# Patient Record
Sex: FEMALE | Race: BLACK OR AFRICAN AMERICAN | NOT HISPANIC OR LATINO | Employment: FULL TIME | URBAN - METROPOLITAN AREA
[De-identification: names, ages, dates, MRNs, and addresses within clinical notes are randomized per-mention and may not be internally consistent; named-entity substitution may affect disease eponyms.]

---

## 2016-06-27 LAB
CHOLEST SERPL-MCNC: 148 MG/DL
GLUCOSE SERPL-MCNC: 75 MG/DL (ref 70–120)
HDLC SERPL-MCNC: 55 MG/DL
LDLC SERPL CALC-MCNC: 80 MG/DL
TRIGL SERPL-MCNC: 64 MG/DL

## 2017-03-02 ENCOUNTER — TRANSFERRED RECORDS (OUTPATIENT)
Dept: HEALTH INFORMATION MANAGEMENT | Facility: CLINIC | Age: 52
End: 2017-03-02

## 2017-03-02 ENCOUNTER — HOSPITAL ENCOUNTER (OUTPATIENT)
Dept: MAMMOGRAPHY | Facility: HOSPITAL | Age: 52
Discharge: HOME | End: 2017-03-02
Attending: FAMILY MEDICINE

## 2017-03-02 DIAGNOSIS — Z12.31 VISIT FOR SCREENING MAMMOGRAM: ICD-10-CM

## 2017-03-03 ENCOUNTER — TRANSFERRED RECORDS (OUTPATIENT)
Dept: HEALTH INFORMATION MANAGEMENT | Facility: CLINIC | Age: 52
End: 2017-03-03

## 2017-03-06 ENCOUNTER — RECORDS - HEALTHEAST (OUTPATIENT)
Dept: ADMINISTRATIVE | Facility: OTHER | Age: 52
End: 2017-03-06

## 2017-03-07 ENCOUNTER — HOSPITAL ENCOUNTER (OUTPATIENT)
Dept: MAMMOGRAPHY | Facility: HOSPITAL | Age: 52
Discharge: HOME | End: 2017-03-07
Attending: FAMILY MEDICINE

## 2017-03-07 ENCOUNTER — TRANSFERRED RECORDS (OUTPATIENT)
Dept: HEALTH INFORMATION MANAGEMENT | Facility: CLINIC | Age: 52
End: 2017-03-07

## 2017-03-07 DIAGNOSIS — N64.89 BREAST ASYMMETRY: ICD-10-CM

## 2017-05-30 ENCOUNTER — TRANSFERRED RECORDS (OUTPATIENT)
Dept: HEALTH INFORMATION MANAGEMENT | Facility: CLINIC | Age: 52
End: 2017-05-30

## 2017-12-06 ENCOUNTER — TRANSFERRED RECORDS (OUTPATIENT)
Dept: HEALTH INFORMATION MANAGEMENT | Facility: CLINIC | Age: 52
End: 2017-12-06

## 2017-12-29 ENCOUNTER — TRANSFERRED RECORDS (OUTPATIENT)
Dept: HEALTH INFORMATION MANAGEMENT | Facility: CLINIC | Age: 52
End: 2017-12-29

## 2018-03-20 ENCOUNTER — HOSPITAL ENCOUNTER (OUTPATIENT)
Dept: MAMMOGRAPHY | Facility: CLINIC | Age: 53
Discharge: HOME | End: 2018-03-20

## 2018-03-20 ENCOUNTER — TRANSFERRED RECORDS (OUTPATIENT)
Dept: HEALTH INFORMATION MANAGEMENT | Facility: CLINIC | Age: 53
End: 2018-03-20

## 2018-03-20 DIAGNOSIS — Z12.31 VISIT FOR SCREENING MAMMOGRAM: ICD-10-CM

## 2019-03-19 ENCOUNTER — OFFICE VISIT (OUTPATIENT)
Dept: FAMILY MEDICINE | Facility: CLINIC | Age: 54
End: 2019-03-19
Payer: COMMERCIAL

## 2019-03-19 VITALS
RESPIRATION RATE: 16 BRPM | HEART RATE: 58 BPM | TEMPERATURE: 97.1 F | WEIGHT: 228.7 LBS | HEIGHT: 63 IN | DIASTOLIC BLOOD PRESSURE: 95 MMHG | SYSTOLIC BLOOD PRESSURE: 139 MMHG | BODY MASS INDEX: 40.52 KG/M2

## 2019-03-19 DIAGNOSIS — R42 DIZZINESS: ICD-10-CM

## 2019-03-19 DIAGNOSIS — E86.0 DEHYDRATION: ICD-10-CM

## 2019-03-19 DIAGNOSIS — Z00.01 ENCOUNTER FOR ROUTINE ADULT MEDICAL EXAM WITH ABNORMAL FINDINGS: Primary | ICD-10-CM

## 2019-03-19 DIAGNOSIS — Z13.220 SCREENING FOR LIPID DISORDERS: ICD-10-CM

## 2019-03-19 DIAGNOSIS — E66.01 MORBID OBESITY (H): ICD-10-CM

## 2019-03-19 LAB
ANION GAP SERPL CALCULATED.3IONS-SCNC: 4 MMOL/L (ref 3–14)
BUN SERPL-MCNC: 11 MG/DL (ref 7–30)
CALCIUM SERPL-MCNC: 8.5 MG/DL (ref 8.5–10.1)
CHLORIDE SERPL-SCNC: 109 MMOL/L (ref 94–109)
CHOLEST SERPL-MCNC: 170 MG/DL
CO2 SERPL-SCNC: 27 MMOL/L (ref 20–32)
CREAT SERPL-MCNC: 0.92 MG/DL (ref 0.52–1.04)
ERYTHROCYTE [DISTWIDTH] IN BLOOD BY AUTOMATED COUNT: 14 % (ref 10–15)
GFR SERPL CREATININE-BSD FRML MDRD: 71 ML/MIN/{1.73_M2}
GLUCOSE SERPL-MCNC: 74 MG/DL (ref 70–99)
HCT VFR BLD AUTO: 41.2 % (ref 35–47)
HDLC SERPL-MCNC: 75 MG/DL
HGB BLD-MCNC: 13.5 G/DL (ref 11.7–15.7)
LDLC SERPL CALC-MCNC: 87 MG/DL
MCH RBC QN AUTO: 29 PG (ref 26.5–33)
MCHC RBC AUTO-ENTMCNC: 32.8 G/DL (ref 31.5–36.5)
MCV RBC AUTO: 89 FL (ref 78–100)
NONHDLC SERPL-MCNC: 95 MG/DL
PLATELET # BLD AUTO: 149 10E9/L (ref 150–450)
POTASSIUM SERPL-SCNC: 3.7 MMOL/L (ref 3.4–5.3)
RBC # BLD AUTO: 4.65 10E12/L (ref 3.8–5.2)
SODIUM SERPL-SCNC: 140 MMOL/L (ref 133–144)
TRIGL SERPL-MCNC: 42 MG/DL
WBC # BLD AUTO: 4.3 10E9/L (ref 4–11)

## 2019-03-19 PROCEDURE — 80061 LIPID PANEL: CPT | Performed by: FAMILY MEDICINE

## 2019-03-19 PROCEDURE — 99386 PREV VISIT NEW AGE 40-64: CPT | Performed by: FAMILY MEDICINE

## 2019-03-19 PROCEDURE — 85027 COMPLETE CBC AUTOMATED: CPT | Performed by: FAMILY MEDICINE

## 2019-03-19 PROCEDURE — 80048 BASIC METABOLIC PNL TOTAL CA: CPT | Performed by: FAMILY MEDICINE

## 2019-03-19 PROCEDURE — 36415 COLL VENOUS BLD VENIPUNCTURE: CPT | Performed by: FAMILY MEDICINE

## 2019-03-19 SDOH — HEALTH STABILITY: MENTAL HEALTH: HOW OFTEN DO YOU HAVE A DRINK CONTAINING ALCOHOL?: NEVER

## 2019-03-19 ASSESSMENT — MIFFLIN-ST. JEOR: SCORE: 1603.57

## 2019-03-19 NOTE — PROGRESS NOTES
SUBJECTIVE:   CC: Tracy Kern is an 54 year old woman who presents for preventive health visit.     New patient   Was at [facility]  No records here     Healthy Habits:    Do you get at least three servings of calcium containing foods daily (dairy, green leafy vegetables, etc.)? yes    Amount of exercise or daily activities, outside of work: 3 day(s) per week    Problems taking medications regularly No    Medication side effects: No    Have you had an eye exam in the past two years? yes    Do you see a dentist twice per year? yes    Do you have sleep apnea, excessive snoring or daytime drowsiness?yes- snoring     No apnea, sometimes tired during the day     Would like hemoglobin and lytes checked   Feeling dizzy  Drinking 24 oz/day of fluids. Wondering if she is dehydrated   No cp/sob/cough  No syncope     Today's PHQ-2 Score: 0    Abuse: Current or Past(Physical, Sexual or Emotional)- No  Do you feel safe in your environment? Yes    Social History     Tobacco Use     Smoking status: Never Smoker     Smokeless tobacco: Never Used   Substance Use Topics     Alcohol use: No     Frequency: Never     If you drink alcohol do you typically have >3 drinks per day or >7 drinks per week? No                     Reviewed orders with patient.  Reviewed health maintenance and updated orders accordingly - Yes  Labs reviewed in [facility]    Mammogram Screening: Patient over age 50, mutual decision to screen reflected in health maintenance.    Pertinent mammograms are reviewed under the imaging tab.  History of abnormal Pap smear: Last 3 Pap and HPV Results:       Reviewed and updated as needed this visit by clinical staff         Reviewed and updated as needed this visit by Provider          ROS:  CONSTITUTIONAL: NEGATIVE for fever, chills, change in weight  INTEGUMENTARY/SKIN: NEGATIVE for worrisome rashes, moles or lesions  EYES: NEGATIVE for vision changes or irritation  ENT: NEGATIVE for ear, mouth and throat problems  RESP:  "NEGATIVE for significant cough or SOB  BREAST: NEGATIVE for masses, tenderness or discharge  CV: NEGATIVE for chest pain, palpitations or peripheral edema  GI: NEGATIVE for nausea, abdominal pain, heartburn, or change in bowel habits  : NEGATIVE for unusual urinary or vaginal symptoms. No vaginal bleeding.  MUSCULOSKELETAL: NEGATIVE for significant arthralgias or myalgia  NEURO: NEGATIVE for weakness, dizziness or paresthesias  PSYCHIATRIC: NEGATIVE for changes in mood or affect     OBJECTIVE:   BP (!) 139/95 (BP Location: Right arm, Patient Position: Sitting, Cuff Size: Adult Large)   Pulse 58   Temp 97.1  F (36.2  C) (Tympanic)   Resp 16   Ht 1.588 m (5' 2.5\")   Wt 103.7 kg (228 lb 11.2 oz)   BMI 41.16 kg/m    EXAM:  GENERAL: healthy, alert and no distress  EYES: Eyes grossly normal to inspection, PERRL and conjunctivae and sclerae normal  HENT: ear canals and TM's normal, nose and mouth without ulcers or lesions  NECK: no adenopathy, no asymmetry, masses, or scars and thyroid normal to palpation  RESP: lungs clear to auscultation - no rales, rhonchi or wheezes  BREAST: normal without masses, tenderness or nipple discharge and no palpable axillary masses or adenopathy  CV: regular rate and rhythm, normal S1 S2, no S3 or S4, no murmur, click or rub, no peripheral edema and peripheral pulses strong  ABDOMEN: soft, nontender, no hepatosplenomegaly, no masses and bowel sounds normal  MS: no gross musculoskeletal defects noted, no edema  SKIN: no suspicious lesions or rashes  NEURO: Normal strength and tone, mentation intact and speech normal  PSYCH: mentation appears normal, affect normal/bright    Diagnostic Test Results:  none     ASSESSMENT/PLAN:   (Z00.01) Encounter for routine adult medical exam with abnormal findings  (primary encounter diagnosis)  Comment: We discussed self breast exams, exercise 30mins/day, and calcium with vitamin D at 1200mg/day, preferably from dietary sources.  Diet, Weight loss, " "and Exercise were discussed as well    JUAN C for colonoscopy and pap - I offered to just do a pap today and she declines   Lipids today   shingrix when its available        Plan:     (R42) Dizziness  Comment: likely due to dehydration. She requests cbc and bmp  Plan: Basic metabolic panel, CBC with platelets            (Z13.220) Screening for lipid disorders  Comment:   Plan: Lipid panel reflex to direct LDL Fasting            (E66.01) Morbid obesity (H)  Comment:   Plan:     (E86.0) Dehydration  Comment: increase fluid intake to 60-80oz/day and see how dizziness responds   Plan:     COUNSELING:   Reviewed preventive health counseling, as reflected in patient instructions       Regular exercise       Healthy diet/nutrition    BP Readings from Last 1 Encounters:   03/19/19 (!) 139/95     Estimated body mass index is 41.16 kg/m  as calculated from the following:    Height as of this encounter: 1.588 m (5' 2.5\").    Weight as of this encounter: 103.7 kg (228 lb 11.2 oz).    BP Screening:   Last 3 BP Readings:    BP Readings from Last 3 Encounters:   03/19/19 (!) 139/95       The following was recommended to the patient:  Re-screen within 4 weeks and recommend lifestyle modifications  Weight management plan: Discussed healthy diet and exercise guidelines     reports that  has never smoked. she has never used smokeless tobacco.      Counseling Resources:  ATP IV Guidelines  Pooled Cohorts Equation Calculator  Breast Cancer Risk Calculator  FRAX Risk Assessment  ICSI Preventive Guidelines  Dietary Guidelines for Americans, 2010  USDA's MyPlate  ASA Prophylaxis  Lung CA Screening    Denae Boone MD  [facility]  "

## 2019-04-10 ENCOUNTER — TRANSFERRED RECORDS (OUTPATIENT)
Dept: HEALTH INFORMATION MANAGEMENT | Facility: CLINIC | Age: 54
End: 2019-04-10

## 2019-04-10 ENCOUNTER — HOSPITAL ENCOUNTER (OUTPATIENT)
Dept: MAMMOGRAPHY | Facility: CLINIC | Age: 54
Discharge: HOME | End: 2019-04-10

## 2019-04-10 DIAGNOSIS — Z12.31 VISIT FOR SCREENING MAMMOGRAM: ICD-10-CM

## 2019-06-25 ENCOUNTER — TELEPHONE (OUTPATIENT)
Dept: FAMILY MEDICINE | Facility: CLINIC | Age: 54
End: 2019-06-25

## 2019-06-25 NOTE — TELEPHONE ENCOUNTER
Panel Management Review      Patient has the following on her problem list: None      Composite cancer screening  Chart review shows that this patient is due/due soon for the following Pap Smear  Summary:    Patient is due/failing the following:   PAP    Action needed:   Pap/hpv needs to abstracted from Care Everywhere.     Type of outreach:    none - abstract    Questions for provider review:    None                                                                                                                                    Ana Nowak CMA       Chart routed to Care Team .

## 2019-12-28 ENCOUNTER — OFFICE VISIT - HEALTHEAST (OUTPATIENT)
Dept: FAMILY MEDICINE | Facility: CLINIC | Age: 54
End: 2019-12-28

## 2019-12-28 DIAGNOSIS — H66.002 NON-RECURRENT ACUTE SUPPURATIVE OTITIS MEDIA OF LEFT EAR WITHOUT SPONTANEOUS RUPTURE OF TYMPANIC MEMBRANE: ICD-10-CM

## 2019-12-28 DIAGNOSIS — S09.91XA TRAUMA OF EAR CANAL, INITIAL ENCOUNTER: ICD-10-CM

## 2019-12-28 ASSESSMENT — MIFFLIN-ST. JEOR: SCORE: 1579.96

## 2020-01-07 ENCOUNTER — OFFICE VISIT (OUTPATIENT)
Dept: FAMILY MEDICINE | Facility: CLINIC | Age: 55
End: 2020-01-07
Payer: COMMERCIAL

## 2020-01-07 VITALS
BODY MASS INDEX: 41.41 KG/M2 | TEMPERATURE: 97.5 F | HEIGHT: 63 IN | RESPIRATION RATE: 20 BRPM | WEIGHT: 233.7 LBS | SYSTOLIC BLOOD PRESSURE: 123 MMHG | HEART RATE: 66 BPM | DIASTOLIC BLOOD PRESSURE: 78 MMHG

## 2020-01-07 DIAGNOSIS — H72.92 PERFORATION OF TYMPANIC MEMBRANE, LEFT: Primary | ICD-10-CM

## 2020-01-07 DIAGNOSIS — R19.8 GAGGING EPISODE: ICD-10-CM

## 2020-01-07 DIAGNOSIS — H91.92 HEARING LOSS OF LEFT EAR, UNSPECIFIED HEARING LOSS TYPE: ICD-10-CM

## 2020-01-07 PROCEDURE — 99214 OFFICE O/P EST MOD 30 MIN: CPT | Performed by: FAMILY MEDICINE

## 2020-01-07 RX ORDER — OMEPRAZOLE 20 MG/1
20 TABLET, DELAYED RELEASE ORAL DAILY
Qty: 30 TABLET | Refills: 0 | Status: SHIPPED | OUTPATIENT
Start: 2020-01-07 | End: 2023-08-02

## 2020-01-07 ASSESSMENT — MIFFLIN-ST. JEOR: SCORE: 1634.19

## 2020-01-07 NOTE — PATIENT INSTRUCTIONS
[facility] [facility] [phone]   [email or web address]  FMG: [facility] [phone]   [email or web address]  [facility]: [facility] N[facility] (O[facility]ology) [facility] [phone]  [email or web address]      Try taking daily prilosec - omeprazole 20mg/day

## 2020-01-07 NOTE — PROGRESS NOTES
"SUBJECTIVE:                                                    Tracy Kern is a 54 year old female who presents to clinic today for the following health issues:    ED/UC Followup:    Facility:  [facility]   Date of visit: 12/28/2019  Reason for visit: Ear pain   Current Status: Patient said she finished the amoxicillin prescribed. Patient said the pain in the ear is gone but it is harder for her to hear.     Diagnosed with OM and TM perforation  Treated with amoxicillin   Currently:   Ear pain is improved now  Hearing was greatly diminished but is better now     Regurgitating saliva or food, \"gagging on it\"   Feels like food sometimes gets stuck   Denies gerd symptoms   Reports intermittent sensation of things getting stuck in upper airway. Denies any lower dysphagia  No change in vocal quality    Review of systems:  No f/c   No n/v   Normal appetite and energy level      Problem list and histories reviewed & adjusted, as indicated.  Additional history: as documented   Patient Active Problem List   Diagnosis     Morbid obesity (H)     Current Outpatient Medications   Medication     Acetaminophen (TYLENOL ARTHRITIS PAIN PO)     Multiple Vitamins-Minerals (MULTIVITAMIN PO)     omeprazole (PRILOSEC OTC) 20 MG EC tablet     No current facility-administered medications for this visit.         Allergies   Allergen Reactions     Chloroquine Itching         OBJECTIVE:                                                    /78   Pulse 66   Temp 97.5  F (36.4  C) (Tympanic)   Resp 20   Ht 1.6 m (5' 3\")   Wt 106 kg (233 lb 11.2 oz)   BMI 41.40 kg/m   Body mass index is 41.4 kg/m .   GENERAL - Pt is alert and oriented in no acute distress.  Affect is appropriate. Good eye contact.  HEET - Head is normocephalic, atraumatic.    PERRLA,EEMI. Conjunctiva are free of icterus or erythema.   Left canal with some clotted blood and TM can only be partially visualized due to clotted blood on it. What is seen is " normal in color and general appearance  Right canal and TM are normal.     Oropharynx free of masses and lesions, no tonsillar exudate or petechiae.    NECK - Neck is supple w/o LA or thyromegaly  RESPIRATORY - Clear to auscultation bilaterally.  No wheezing noted  CV - RRR, no murmurs, rubs, gallops.          ASSESSMENT/PLAN:                                                      (H72.92) Perforation of tympanic membrane, left  (primary encounter diagnosis)  Comment:I don't see the perforation but that is likely because it is covered by a blood clot. We discussed that TM rupture can take 4-6 weeks to heal.  Avoid swimming and water in the canal for now. It should heal and hearing should continue to improve. If not, see ENT for next step discussion. The patient indicates understanding of these issues and agrees with the plan.   Plan: OTOLARYNGOLOGY REFERRAL            (H91.92) Hearing loss of left ear, unspecified hearing loss type  Comment: improving already and should continue to improve   Plan: OTOLARYNGOLOGY REFERRAL            (R19.8) Gagging episode  Comment: discussed work up.  Will start daily PPI and see ENT for upper airway assessment. Next steps could include swallow study and/or EMG.  The patient indicates understanding of these issues and agrees with the plan.   Plan: OTOLARYNGOLOGY REFERRAL, omeprazole (PRILOSEC         OTC) 20 MG EC tablet          AMENA Boone MD   [facility]

## 2020-01-15 ENCOUNTER — TRANSFERRED RECORDS (OUTPATIENT)
Dept: HEALTH INFORMATION MANAGEMENT | Facility: CLINIC | Age: 55
End: 2020-01-15

## 2020-01-15 ENCOUNTER — OFFICE VISIT - HEALTHEAST (OUTPATIENT)
Dept: AUDIOLOGY | Facility: CLINIC | Age: 55
End: 2020-01-15

## 2020-01-15 ENCOUNTER — OFFICE VISIT - HEALTHEAST (OUTPATIENT)
Dept: OTOLARYNGOLOGY | Facility: CLINIC | Age: 55
End: 2020-01-15

## 2020-01-15 DIAGNOSIS — R13.14 PHARYNGOESOPHAGEAL DYSPHAGIA: ICD-10-CM

## 2020-01-15 DIAGNOSIS — H90.A32 MIXED CONDUCTIVE AND SENSORINEURAL HEARING LOSS OF LEFT EAR WITH RESTRICTED HEARING OF RIGHT EAR: ICD-10-CM

## 2020-01-15 DIAGNOSIS — H65.90 FLUID COLLECTION OF MIDDLE EAR: ICD-10-CM

## 2020-01-22 ENCOUNTER — HOSPITAL ENCOUNTER (OUTPATIENT)
Dept: RADIOLOGY | Facility: HOSPITAL | Age: 55
Discharge: HOME | End: 2020-01-22
Attending: OTOLARYNGOLOGY

## 2020-01-22 DIAGNOSIS — R13.14 PHARYNGOESOPHAGEAL DYSPHAGIA: ICD-10-CM

## 2020-02-06 ENCOUNTER — COMMUNICATION - HEALTHEAST (OUTPATIENT)
Dept: OTOLARYNGOLOGY | Facility: CLINIC | Age: 55
End: 2020-02-06

## 2020-02-06 DIAGNOSIS — K22.9 ESOPHAGEAL ABNORMALITY: ICD-10-CM

## 2020-10-28 ENCOUNTER — VIRTUAL VISIT (OUTPATIENT)
Dept: FAMILY MEDICINE | Facility: CLINIC | Age: 55
End: 2020-10-28
Payer: COMMERCIAL

## 2020-10-28 DIAGNOSIS — B34.9 VIRAL ILLNESS: Primary | ICD-10-CM

## 2020-10-28 PROBLEM — R53.83 OTHER MALAISE AND FATIGUE: Status: ACTIVE | Noted: 2020-10-28

## 2020-10-28 PROBLEM — R76.11 NONSPECIFIC REACTION TO TUBERCULIN SKIN TEST WITHOUT ACTIVE TUBERCULOSIS: Status: ACTIVE | Noted: 2020-10-28

## 2020-10-28 PROBLEM — M54.9 BACKACHE: Status: ACTIVE | Noted: 2020-10-28

## 2020-10-28 PROBLEM — R53.81 OTHER MALAISE AND FATIGUE: Status: ACTIVE | Noted: 2020-10-28

## 2020-10-28 PROCEDURE — 99213 OFFICE O/P EST LOW 20 MIN: CPT | Mod: 95 | Performed by: PHYSICIAN ASSISTANT

## 2020-10-28 NOTE — LETTER
[facility]  [address]  [place] [address]  Phone: [phone]    October 28, 2020        Tracy Kern  [address] [facility] [address]          To whom it may concern:    RE: Tracy Kern    I recommend that patient does not return to work until November 6 due to illness.    Please contact me for questions or concerns.      Sincerely,        Jessica Chairez PA-C

## 2020-10-28 NOTE — PROGRESS NOTES
"Tracy Kern is a 55 year old female who is being evaluated via a billable telephone visit.      The patient has been notified of following:     \"This telephone visit will be conducted via a call between you and your physician/provider. We have found that certain health care needs can be provided without the need for a physical exam.  This service lets us provide the care you need with a short phone conversation.  If a prescription is necessary we can send it directly to your pharmacy.  If lab work is needed we can place an order for that and you can then stop by our lab to have the test done at a later time.    Telephone visits are billed at different rates depending on your insurance coverage. During this emergency period, for some insurers they may be billed the same as an in-person visit.  Please reach out to your insurance provider with any questions.    If during the course of the call the physician/provider feels a telephone visit is not appropriate, you will not be charged for this service.\"    Patient has given verbal consent for Telephone visit?  Yes    What phone number would you like to be contacted at? [phone]    How would you like to obtain your AVS? Mail a copy    Subjective     Tracy Kern is a 55 year old female who presents via phone visit today for the following health issues:    HPI       Concern for COVID-19  About how many days ago did these symptoms start? 10/22/2020  Is this your first visit for this illness? No   How would you describe your symptoms since your last visit? My symptoms have stayed the same Negative Covid and flu tests 10/26/2020  In the 14 days before your symptoms started, have you had close contact with someone with COVID-19 (Coronavirus)? Yes, I have been in contact with someone who has COVID-19/Coronavirus (confirmed by lab test). 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 Yes, the highest temperature was 101.9  Are you having new or " worsening difficulty breathing? No  Do you have new or worsening cough? No  Have you had any new or unexplained body aches? YES    Have you experienced any of the following NEW symptoms?    Headache: YES -history of migraines    Sore throat: No    Loss of taste or smell: YES - taste    Chest pain: No    Diarrhea: No    Rash: No  What treatments have you tried? Tylenol, Vitamin C, Vitamin D, Zinc  Who do you live with?   Are you, or a household member, a healthcare worker or a ? YES-works in [facility] [employer] of nursing [place]  Do you live in a nursing home, group home, or shelter? No  Do you have a way to get food/medications if quarantined? Yes, I have a friend or family member who can help me.     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  Tested every week at work.  10/26 rapid test and test sent to the lab  Achey, headache, loss of taste. Headaches intermittent - pounding forehead  Has history of migraines - pressure to eye, no n/v vision changes aura  Aching is getting better  Not eating much because of loss of taste but forcing herself to. Drinking fluids and urinating.  Things are slowly improving     She was told to get a letter to be able to quarantine from work 14 days. She would like to wait until symptoms resolve  [phone]  Attn Rachelle in HR      Review of Systems   Other than noted above, general, HEENT, respiratory, cardiac, MS, and gastrointestinal systems are negative.        Objective          Vitals:  No vitals were obtained today due to virtual visit.    healthy, alert and no distress  PSYCH: Alert and oriented times 3; coherent speech, normal   rate and volume, able to articulate logical thoughts, able   to abstract reason, no tangential thoughts, no hallucinations   or delusions  Her affect is normal  RESP: No cough, no audible wheezing,  able to talk in full sentences  Remainder of exam unable to be completed due to telephone visits        Assessment/Plan:    Assessment & Plan     ASSESSMENT/PLAN:      ICD-10-CM    1. Viral illness  B34.9        Patient Instructions   Monitor your symptoms  Rest, plenty of fluids  Contact your employer for recommendations on fever and if they need to retest COVID-19  Be seen urgently if worsening symptoms such as shortness of breath, abdominal pain, severe headache or neck pain, dehydration    Return in about 1 week (around 11/4/2020) for if not improving or if worsening.    Jessica Chairez PA-C  [facility]    Phone call duration:  16 minutes

## 2020-10-28 NOTE — PATIENT INSTRUCTIONS
Monitor your symptoms  Rest, plenty of fluids  Contact your employer for recommendations on fever and if they need to retest COVID-19  Be seen urgently if worsening symptoms such as shortness of breath, abdominal pain, severe headache or neck pain, dehydration

## 2020-11-03 ENCOUNTER — TELEPHONE (OUTPATIENT)
Dept: FAMILY MEDICINE | Facility: CLINIC | Age: 55
End: 2020-11-03

## 2020-11-03 NOTE — TELEPHONE ENCOUNTER
Reason for Call:  Other     Detailed comments: Pt is calling and had a virtual visit Oct 28, and has been quarantined since 22nd, no fever starting to get taste back wondering about getting lab work?    Phone Number Patient can be reached at: Home number on file [phone] (home)    Best Time: any    Can we leave a detailed message on this number? YES    Call taken on 11/3/2020 at 1:11 PM by Linda Vidal

## 2020-11-03 NOTE — TELEPHONE ENCOUNTER
"Reviewed visit notes 10/28/20.  Call placed to patient.  Patient states she was scheduled to return to work 11/6/20 as her 14 day quarantine is complete on the 5th.  Patient states she is too weak to return to work.  Day 2 without fever.  Patient states she wants another week off of work to recover.  Patient states she has had covid - test, negative influenza.   Patient states there must be something else wrong as she has felt so ill.  Patient wants clinic appointment and labs\"Need to check my white count and electrolytes\".  Explained, she likely has covid due to the symptoms she is reporting are consistent with covid, can be false negative.  Will forward to MISTY Chairez to advise.  Sherrie Montano RN         "

## 2020-11-04 ENCOUNTER — VIRTUAL VISIT (OUTPATIENT)
Dept: FAMILY MEDICINE | Facility: CLINIC | Age: 55
End: 2020-11-04
Payer: COMMERCIAL

## 2020-11-04 DIAGNOSIS — B34.9 VIRAL ILLNESS: Primary | ICD-10-CM

## 2020-11-04 PROCEDURE — 99213 OFFICE O/P EST LOW 20 MIN: CPT | Mod: TEL | Performed by: PHYSICIAN ASSISTANT

## 2020-11-04 NOTE — LETTER
[facility]  [address]  [place] [address]  Phone: [phone]    November 4, 2020        Tracy Kern  [address] [facility] [address]          To whom it may concern:    RE: Tracy Kern    Patient will miss work until 11/11/20 due to illness    Please contact me for questions or concerns.      Sincerely,        Jessica Chairez PA-C

## 2020-11-04 NOTE — PROGRESS NOTES
"Tracy Kern is a 55 year old female who is being evaluated via a billable telephone visit.      The patient has been notified of following:     \"This telephone visit will be conducted via a call between you and your physician/provider. We have found that certain health care needs can be provided without the need for a physical exam.  This service lets us provide the care you need with a short phone conversation.  If a prescription is necessary we can send it directly to your pharmacy.  If lab work is needed we can place an order for that and you can then stop by our lab to have the test done at a later time.    Telephone visits are billed at different rates depending on your insurance coverage. During this emergency period, for some insurers they may be billed the same as an in-person visit.  Please reach out to your insurance provider with any questions.    If during the course of the call the physician/provider feels a telephone visit is not appropriate, you will not be charged for this service.\"    Patient has given verbal consent for Telephone visit?  Yes    What phone number would you like to be contacted at? [phone]    How would you like to obtain your AVS? Mail a copy    Subjective     Tracy Kern is a 55 year old female who presents via phone visit today for the following health issues:    HPI       Chief Complaint   Patient presents with     Follow Up     see today's telephone encounter      she has symptoms since 10/22  Last fever 10/31 100 degrees, normal since (98.5-98.9)  No cough or SOB   Taste is starting to come back but seems on and off  Headache is resolved, that was 10/28  Body aches are improving. She does feel her muscles are weak still especially while walking around. She does have low energy  She is eating but has low appetite. She drinking fluids, urinating but it is sometimes concentrated  No diarrhea/constipation  Negative COVID-19 test 10/26  She was retested for COVID-19 but she " does not have results yet    She would like lab visit in 2 days assuming COVID-19 test is negative      Review of Systems   Other than noted above, general, HEENT, respiratory, cardiac, MS, and gastrointestinal systems are negative.        Objective          Vitals:  No vitals were obtained today due to virtual visit.    healthy, alert and no distress  PSYCH: Alert and oriented times 3; coherent speech, normal   rate and volume, able to articulate logical thoughts, able   to abstract reason, no tangential thoughts, no hallucinations   or delusions  Her affect is normal  RESP: No cough, no audible wheezing, able to talk in full sentences  Remainder of exam unable to be completed due to telephone visits        Assessment/Plan:    Assessment & Plan     ASSESSMENT/PLAN:      ICD-10-CM    1. Viral illness  B34.9 CBC with platelets and differential     Comprehensive metabolic panel (BMP + Alb, Alk Phos, ALT, AST, Total. Bili, TP)       Patient Instructions   Keep resting, drinking fluids, eating as able  If symptoms worsen such as weakness, lethargic, dehydration - go to the ER  Virtual visit next week if not improving further    Return in about 1 week (around 11/11/2020) for if not improving or if worsening.    Jessica Chairez PA-C  [facility]    Phone call duration:  14 minutes

## 2020-11-04 NOTE — PATIENT INSTRUCTIONS
Keep resting, drinking fluids, eating as able  If symptoms worsen such as weakness, lethargic, dehydration - go to the ER  Virtual visit next week if not improving further

## 2020-11-04 NOTE — TELEPHONE ENCOUNTER
We had talked about her contacting her employer to repeat a COVID-19 test, I recommend doing so. We can send in an order to repeat it, but she likely could get it done more quickly through her employer.  I would like to do another virtual visit to discuss this further, I have further questions that would be a lot of back and forth. Could add it on to the end of my day or over lunch.  However -   If she is worried about her electrolytes and is getting dehydrated or not drinking fluids, or if she feels very weak, lethargic, or ill - she should go to the urgent care to have an exam and bloodwork, possibly fluids.  Jessica Chairez PA-C

## 2020-11-06 DIAGNOSIS — B34.9 VIRAL ILLNESS: ICD-10-CM

## 2020-11-06 LAB
ALBUMIN SERPL-MCNC: 3.5 G/DL (ref 3.4–5)
ALP SERPL-CCNC: 88 U/L (ref 40–150)
ALT SERPL W P-5'-P-CCNC: 29 U/L (ref 0–50)
ANION GAP SERPL CALCULATED.3IONS-SCNC: 4 MMOL/L (ref 3–14)
AST SERPL W P-5'-P-CCNC: 22 U/L (ref 0–45)
BASOPHILS # BLD AUTO: 0 10E9/L (ref 0–0.2)
BASOPHILS NFR BLD AUTO: 0 %
BILIRUB SERPL-MCNC: 0.6 MG/DL (ref 0.2–1.3)
BUN SERPL-MCNC: 12 MG/DL (ref 7–30)
CALCIUM SERPL-MCNC: 9.2 MG/DL (ref 8.5–10.1)
CHLORIDE SERPL-SCNC: 106 MMOL/L (ref 94–109)
CO2 SERPL-SCNC: 30 MMOL/L (ref 20–32)
CREAT SERPL-MCNC: 0.99 MG/DL (ref 0.52–1.04)
DIFFERENTIAL METHOD BLD: ABNORMAL
EOSINOPHIL # BLD AUTO: 0 10E9/L (ref 0–0.7)
EOSINOPHIL NFR BLD AUTO: 0.8 %
ERYTHROCYTE [DISTWIDTH] IN BLOOD BY AUTOMATED COUNT: 13.1 % (ref 10–15)
GFR SERPL CREATININE-BSD FRML MDRD: 64 ML/MIN/{1.73_M2}
GLUCOSE SERPL-MCNC: 91 MG/DL (ref 70–99)
HCT VFR BLD AUTO: 42 % (ref 35–47)
HGB BLD-MCNC: 13.7 G/DL (ref 11.7–15.7)
LYMPHOCYTES # BLD AUTO: 2.2 10E9/L (ref 0.8–5.3)
LYMPHOCYTES NFR BLD AUTO: 56.5 %
MCH RBC QN AUTO: 28.4 PG (ref 26.5–33)
MCHC RBC AUTO-ENTMCNC: 32.6 G/DL (ref 31.5–36.5)
MCV RBC AUTO: 87 FL (ref 78–100)
MONOCYTES # BLD AUTO: 0.4 10E9/L (ref 0–1.3)
MONOCYTES NFR BLD AUTO: 9.4 %
NEUTROPHILS # BLD AUTO: 1.3 10E9/L (ref 1.6–8.3)
NEUTROPHILS NFR BLD AUTO: 33.3 %
PLATELET # BLD AUTO: 243 10E9/L (ref 150–450)
POTASSIUM SERPL-SCNC: 3.3 MMOL/L (ref 3.4–5.3)
PROT SERPL-MCNC: 7.9 G/DL (ref 6.8–8.8)
RBC # BLD AUTO: 4.83 10E12/L (ref 3.8–5.2)
SODIUM SERPL-SCNC: 140 MMOL/L (ref 133–144)
WBC # BLD AUTO: 4 10E9/L (ref 4–11)

## 2020-11-06 PROCEDURE — 80053 COMPREHEN METABOLIC PANEL: CPT | Performed by: PHYSICIAN ASSISTANT

## 2020-11-06 PROCEDURE — 36415 COLL VENOUS BLD VENIPUNCTURE: CPT | Performed by: PHYSICIAN ASSISTANT

## 2020-11-06 PROCEDURE — 85025 COMPLETE CBC W/AUTO DIFF WBC: CPT | Performed by: PHYSICIAN ASSISTANT

## 2020-11-11 ENCOUNTER — TELEPHONE (OUTPATIENT)
Dept: FAMILY MEDICINE | Facility: CLINIC | Age: 55
End: 2020-11-11

## 2020-11-11 DIAGNOSIS — E66.01 MORBID OBESITY (H): Primary | ICD-10-CM

## 2020-11-11 NOTE — TELEPHONE ENCOUNTER
Patient notified of 11/6/20 lab results and all of Dr. Riojas's instructions with the results.  Copy of lab results placed at  for her to . Advised her to schedule a lab only appointment for next week when she comes to  the results.  Wesley Spicer RN

## 2021-05-25 ENCOUNTER — RECORDS - HEALTHEAST (OUTPATIENT)
Dept: ADMINISTRATIVE | Facility: CLINIC | Age: 56
End: 2021-05-25

## 2021-05-26 ENCOUNTER — HOSPITAL ENCOUNTER (OUTPATIENT)
Dept: MAMMOGRAPHY | Facility: CLINIC | Age: 56
Discharge: HOME | End: 2021-05-26
Payer: COMMERCIAL

## 2021-05-26 ENCOUNTER — TRANSFERRED RECORDS (OUTPATIENT)
Dept: HEALTH INFORMATION MANAGEMENT | Facility: CLINIC | Age: 56
End: 2021-05-26

## 2021-05-26 DIAGNOSIS — Z12.31 VISIT FOR SCREENING MAMMOGRAM: ICD-10-CM

## 2021-05-31 ENCOUNTER — RECORDS - HEALTHEAST (OUTPATIENT)
Dept: ADMINISTRATIVE | Facility: CLINIC | Age: 56
End: 2021-05-31

## 2021-06-04 VITALS
BODY MASS INDEX: 43.93 KG/M2 | DIASTOLIC BLOOD PRESSURE: 86 MMHG | SYSTOLIC BLOOD PRESSURE: 136 MMHG | WEIGHT: 232.7 LBS | RESPIRATION RATE: 18 BRPM | OXYGEN SATURATION: 98 % | HEART RATE: 96 BPM | TEMPERATURE: 98.4 F | HEIGHT: 61 IN

## 2021-06-05 NOTE — PROGRESS NOTES
HISTORY OF PRESENT ILLNESS  Asked to see by Dr. Boone for evaluation of ear and throat issues. Patient reports that she had a perforated ear drum. She had an ear infection. Her ear drained for one day and then stopped. She was treated with amoxicillin. The pain is better. Her hearing was much worse but it is starting to improve. She feels that there is something in the that is draining. She can hear her heartbeat in the ear. She also reports that she will choke on her own saliva. She has also had regurgitation of her food. She feels that something is stuck in there throat. She was treated with prilosec but she hasn't noticed any improvement.    REVIEW OF SYSTEMS  Review of Systems: a 10-system review was performed. Pertinent positives are noted in the HPI and on a separate scanned document in the chart.    PMH, PSH, FH and SH has documented in the EHR.      EXAM    CONSTITUTIONAL  General Appearance:  Normal, well developed, well nourished, no obvious distress  Ability to Communicate:  communicates appropriately.    HEAD AND FACE  Appearance and Symmetry:  Normal, no scalp or facial scarring or suspicious lesions.  Paranasal sinuses tenderness:  Normal, Paranasal sinuses non tender    EARS  Clinical speech reception threshold:  Normal, able to hear normal speech.  Auricle:  Normal, Auricles without scars, lesions, masses.  External auditory canal:  Normal, External auditory canal normal.  Tympanic membrane: Left middle ear fluid    NOSE (speculum or scope)  Architecture:  Normal, Grossly normal external nasal architecture with no masses or lesions.  Mucosa:  Normal mucosa, No polyps or masses.  Septum:  Normal, Septum non-obstructing.  Turbinates:  Normal, No turbinate abnormalities    ORAL CAVITY AND OROPHARYNX  Lips:  Normal.  Dental and gingiva:  Normal, No obvious dental or gingival disease.  Mucosa:  Normal, Moist mucous membranes.  Tongue:  Normal, Tongue mobile with no mucosal abnormalities  Hard and soft  palate:  Normal, Hard and soft palate without cleft or mucosal lesions.  Oral pharynx:  Normal, Posterior pharynx without lesions or remarkable asymmetry.  Saliva:  Normal, Clear saliva.  Masses:  Normal, No palpable masses or pathologically enlarged lymph nodes.    LARYNGOSCOPY  Risks and benefit of Flexible laryngeal endoscopy were discussed with the patient and they wished to proceed.  The nose was sprayed with 4% lidocaine / 1% phenylephrine.  After allowing adequate time for anesthesia the procedure was started.  Patient tolerated the procedure well.  See exam note for findings.    LARYNX AND HYPOPHARYNX (mirror or scope)  Voice Quality:  Normal voice quality.  Supra glottis:  Normal, No edema or erythema.  Epiglottis:  Normal, No epiglottic mass or mucosal lesions.  Pyriform sinuses:  Normal, Pyriform sinuses clear.  Vocal cords appearance:  Normal, Vocal cord motion symmetric.  Vocal cord motion:  Normal, Vocal cord motion symmetric  Endolarynx:  Normal, No Endolaryngeal mass or mucosal lesions.    NECK  Masses/lymph nodes:  Normal, No worrisome neck masses or lymph nodes.  Salivary glands:  Normal, Parotid and submandibular glands.  Trachea and larynx position:  Normal, Trachea and larynx midline.  Thyroid:  Normal, No thyroid abnormality.  Tenderness:  Normal, No cervical tenderness.  Suppleness:  Normal, Neck supple    NEUROLOGICAL  Speech pattern:  Normal, Proasaic    RESPIRATORY  Symmetry and Respiratory effort:  Normal, Symmetric chest movement and expansion with no increased intercostal retractions or use of accessory muscles.     IMPRESSION  1. Recent acute otitis media which has resolved. She now has middle ear fluid which is not unexpected. I discussed that the fluid can take several weeks to several months to go away.   2. History of throat sensation and regurgitation.    RECOMMENDATION  1. She will follow up if no improvement in 2-4 weeks.  2. Barium esophagram. I discussed the possibility of  esophageal pathology that can cause her symptoms. Esophagram is the simplest way to assess for possible Zenker's diverticulum.    Hola Dixon MD

## 2021-06-17 NOTE — PATIENT INSTRUCTIONS - HE
Patient Instructions by Nanette Cronin MD at 12/28/2019  1:10 PM     Author: Nanette Cronin MD Service: -- Author Type: Physician    Filed: 12/28/2019  2:31 PM Encounter Date: 12/28/2019 Status: Addendum    : Nanette Cronin MD (Physician)    Related Notes: Original Note by Nanette Cronin MD (Physician) filed at 12/28/2019  2:30 PM       1. Keep well hydrated  2. May alternate Tylenol every 6 hours with ibuprofen every 6 hours as needed for fever or pain  3. For follow up, try and be seen at your primary clinic or one of the other [place] sites  4. If you have any questions, call the clinic number  - it's answered 24/7      Patient Education     Middle Ear Infection (Adult)  You have an infection of the middle ear, the space behind the eardrum. This is also called acute otitis media (AOM). Sometimes it is caused by the common cold. This is because congestion can block the internal passage (eustachian tube) that drains fluid from the middle ear. When the middle ear fills with fluid, bacteria can grow there and cause an infection. Oral antibiotics are used to treat this illness, not ear drops. Symptoms usually start to improve within 1 to 2 days of treatment.    Home care  The following are general care guidelines:    Finish all of the antibiotic medicine given, even though you may feel better after the first few days.    You may use over-the-counter medicine, such as acetaminophen or ibuprofen, to control pain and fever, unless something else was prescribed. If you have chronic liver or kidney disease or have ever had a stomach ulcer or gastrointestinal bleeding, talk with your healthcare provider before using these medicines. Do not give aspirin to anyone under 18 years of age who has a fever. It may cause severe illness or death.  Follow-up care  Follow up with your healthcare provider, or as advised, in 2 weeks if all symptoms have not gotten better, or if hearing doesn't go back to normal within  1 month.  When to seek medical advice  Call your healthcare provider right away if any of these occur:    Ear pain gets worse or does not improve after 3 days of treatment    Unusual drowsiness or confusion    Neck pain, stiff neck, or headache    Fluid or blood draining from the ear canal    Fever of 100.4 F (38 C) or as advised     Seizure  Date Last Reviewed: 6/1/2016 2000-2017 [employer]. [address] [place] [address]. All rights reserved. This information is not intended as a substitute for professional medical care. Always follow your healthcare professional's instructions.

## 2021-06-28 NOTE — PROGRESS NOTES
"Progress Notes by Nanette Cronin MD at 12/28/2019  1:10 PM     Author: Nanette Cronin MD Service: -- Author Type: Physician    Filed: 12/30/2019 10:47 AM Encounter Date: 12/28/2019 Status: Signed    : Nanette Cronin MD (Physician)       Provider wore a mask during this visit.     Subjective:   Tracy Kern is a 54 y.o. female and is new to [facility].  Roomed by: TURNER Peraza       Chief Complaint   Patient presents with   ? Ear Pain     L/T ear pain-- drainage from ears--x2 days    Admits drainage from just her left ear. Says hearing is down in left ear.  Patient said that she was trying to clean out her left ear with a Q-tip.  Says a week ago started with runny nose, nasal congestion and coughing. Denies fever. Admits chronic headaches. Appetite has been lower. Admits being not drinking adequate fluids and urinating a little more than usual yesterday. Denies dysuria. Says urination was back to normal this morning.  Denies any recent nausea, vomiting, belly pain, or diarrhea.     PMH - See Problem List  PSH - none  FX - says unknown  SX - Lives with her . Works as an RN at [employer]    Patient Active Problem List   Diagnosis   ? Fatigue   ? Backache   ? Nonspecific reaction to tuberculin skin test without active tuberculosis     Social History     Tobacco Use   ? Smoking status: Never Smoker   ? Smokeless tobacco: Never Used   Substance Use Topics   ? Alcohol use: Not on file   ? Drug use: Not on file      Review of Systems  See HPI for ROS, otherwise balance of other systems negative  Allergies   Allergen Reactions   ? Levofloxacin Itching     No current outpatient medications on file.    Objective:     Vitals:    12/28/19 1357   BP: 136/86   Patient Site: Right Arm   Patient Position: Sitting   Cuff Size: Adult Large   Pulse: 96   Resp: 18   Temp: 98.4  F (36.9  C)   TempSrc: Oral   SpO2: 98%   Weight: (!) 232 lb 11.2 oz (105.6 kg)   Height: 5' 0.5\" (1.537 m)     Gen - Pt in NAD  Eyes - PERRL, " EOMI, Conjunctiva non injected, no drainage  Ears - external canal - no induration, Right TM - non injected,   Left  -  external canal - slight amount of dried blood in canal with induration, TM - moderately injected   Nose - non congested, no nasal drainage  Pharynx - not injected, tonsils 1+ size  Neck - supple, no cervical adenopathy, no masses  Cor - RRR w/o murmur  Lungs - Good air entry; no wheezes or crackles noted on auscultation - no coughing noted  Skin - no lesions, no rashes noted  Neuro - non focal  Psych - Affect - euthymic, well groomed, speech not pressured, good insight, no flight of ideas  Skin - no lesions, no rashes noted     Assessment - Plan   Medical Decision Making - Advised patient to not use Q-tips to clean out her ear, and to use a warm washcloth with her little finger to clean out the outer ear.    1. Non-recurrent acute suppurative otitis media of left ear without spontaneous rupture of tympanic membrane  - amoxicillin (AMOXIL) 500 MG tablet; Take 1 tablet (500 mg total) by mouth 2 (two) times a day for 10 days.  Dispense: 20 tablet; Refill: 0    2. Trauma of ear canal, initial encounter    At the conclusion of the encounter, assessment and plan were discussed. All questions were answered. The patient or guardian acknowledged understanding and was involved in the decision making regarding the overall care plan.    Patient Instructions   1. Keep well hydrated  2. May alternate Tylenol every 6 hours with ibuprofen every 6 hours as needed for fever or pain  3. For follow up, try and be seen at your primary clinic or one of the other [place] sites  4. If you have any questions, call the clinic number  - it's answered 24/7      Patient Education     Middle Ear Infection (Adult)  You have an infection of the middle ear, the space behind the eardrum. This is also called acute otitis media (AOM). Sometimes it is caused by the common cold. This is because congestion can block the internal  passage (eustachian tube) that drains fluid from the middle ear. When the middle ear fills with fluid, bacteria can grow there and cause an infection. Oral antibiotics are used to treat this illness, not ear drops. Symptoms usually start to improve within 1 to 2 days of treatment.    Home care  The following are general care guidelines:    Finish all of the antibiotic medicine given, even though you may feel better after the first few days.    You may use over-the-counter medicine, such as acetaminophen or ibuprofen, to control pain and fever, unless something else was prescribed. If you have chronic liver or kidney disease or have ever had a stomach ulcer or gastrointestinal bleeding, talk with your healthcare provider before using these medicines. Do not give aspirin to anyone under 18 years of age who has a fever. It may cause severe illness or death.  Follow-up care  Follow up with your healthcare provider, or as advised, in 2 weeks if all symptoms have not gotten better, or if hearing doesn't go back to normal within 1 month.  When to seek medical advice  Call your healthcare provider right away if any of these occur:    Ear pain gets worse or does not improve after 3 days of treatment    Unusual drowsiness or confusion    Neck pain, stiff neck, or headache    Fluid or blood draining from the ear canal    Fever of 100.4 F (38 C) or as advised     Seizure  Date Last Reviewed: 6/1/2016 2000-2017 [employer]. [address] [place] [address], [place] [address]. All rights reserved. This information is not intended as a substitute for professional medical care. Always follow your healthcare professional's instructions.

## 2021-06-28 NOTE — PROGRESS NOTES
"Progress Notes by Radha Estrada AuD at 1/15/2020 10:30 AM     Author: Radha Estrada AuD Service: -- Author Type: Audiologist    Filed: 1/21/2020 12:40 PM Encounter Date: 1/15/2020 Status: Signed    : Radha Estrada AuD (Audiologist)       Audiology Report    Referring Provider:  Dr. Dixon    History:  Tracy Kern is seen in conjunction with ENT appointment today.Summary: Audiology visit completed. Please see audiogram below or in \"media\" tab for case history and results.     Results:   Transducer: Circumaural headphones    Reliability was good  and there was good  SRT to PTA agreement.       Plan:  The patient is returned to ENT for follow up.  She should return for retesting with ENT recommendation.     Amarjit Omalley, CCC-A  [place] Licensed Audiologist #[id number]             "

## 2022-11-06 ENCOUNTER — NURSE TRIAGE (OUTPATIENT)
Dept: NURSING | Facility: CLINIC | Age: 57
End: 2022-11-06

## 2022-11-06 NOTE — TELEPHONE ENCOUNTER
Patient calling reporting she tested positive for COVID 19 today.  Symptoms starting 11/5/22 with body aches, headache, temp ranging to 99.6 Tympanic.  Denies difficulty breathing, or chest pain.    Gather patient reported symptoms   Assessment   Current Symptoms at time of phone call, reported by patient: (if no symptoms, document No symptoms] Bodyaches, headache, temp 99.6 Tympanic   Date of Symptom(s) onset (if applicable) 11/4/22     If at time of call, Patients symptoms hare worsened, the Patient should contact 911 or have someone drive them to Emergency Dept promptly:      If Patient calling 911, inform 911 personal that you have tested positive for the Coronavirus (COVID-19).  Place mask on and await 911 to arrive.    If Emergency Dept, If possible, please have another adult drive you to the Emergency Dept but you need to wear mask when in contact with other people.      Monoclonal Antibody Administration    You may be eligible to receive a new treatment with a monoclonal antibody for preventing hospitalization in patients at high risk for complications from COVID-19. This medication is still experimental and available on a limited basis; it is given through an IV and must be given at an infusion center. Please note that not all people who are eligible will receive the medication since it is in limited supply.  Is the patient symptomatic and onset of symptoms within the last 7 days?  Yes  Is the patient interested in a visit with a provider to discuss treatment options?: Yes  Is the patient seen at [facility]?  No: Warm transfer caller to [phone] to be scheduled with a virtual urgent provider.  During transfer, instruct  on appropriate time frame for visit     Review information with Patient    Your result was positive. This means you have COVID-19 (coronavirus).      How can I protect others?    These guidelines are for isolating before returning to work, school or .       If  you DO have symptoms:  o Stay home and away from others  - For at least 5 days after your symptoms started, AND   - You are fever free for 24 hours (with no medicine that reduces fever), AND  - Your other symptoms are better.  o Wear a mask for 10 full days any time you are around others.    If you DON'T have symptoms:  o Stay at home and away from others for at least 5 days after your positive test.  o Wear a mask for 10 full days any time you are around others.    There may be different guidelines for healthcare facilities. Please check with the specific sites before arriving.     If you've been told by a doctor that you were severely ill with COVID-19 or are immunocompromised, you should isolate for at least 10 days.    You should not go back to work until you meet the guidelines above for ending your home isolation. You don't need to be retested for COVID-19 before going back to work--studies show that you won't spread the virus if it's been at least 10 days since your symptoms started (or 20 days, if you have a weak immune system).        Would you like me to review some of that information with you now?  Yes    How can I take care of myself?      Get lots of rest. Drink extra fluids (unless a doctor has told you not to).      Take Tylenol (acetaminophen) for fever or pain. If you have liver or kidney problems, ask your family doctor if it's okay to take Tylenol.     Take either:     650 mg (two 325 mg pills) every 4 to 6 hours, or     1,000 mg (two 500 mg pills) every 8 hours as needed.     Note: Do not take more than 3,000 mg in one day. Acetaminophen is found in many medicines (both prescribed and over-the-counter medicines). Read all labels to be sure you don't take too much.    For children, check the Tylenol bottle for the right dose (based on their age or weight).      If you have other health problems (like cancer, heart failure, an organ transplant or severe kidney disease): Call your specialty clinic  if you don't feel better in the next 2 days.      Know when to call 911: Emergency warning signs include:    Trouble breathing or shortness of breath    Pain or pressure in the chest that doesn't go away    Feeling confused like you haven't felt before, or not being able to wake up    Bluish-colored lips or face        If you were tested for an upcoming procedure, please contact your provider for next steps.     Karina White RN    Reason for Disposition    HIGH RISK for severe COVID complications (e.g., weak immune system, age > 64 years, obesity with BMI > 25, pregnant, chronic lung disease or other chronic medical condition)  (Exception: Already seen by PCP and no new or worsening symptoms.)    Additional Information    Negative: SEVERE difficulty breathing (e.g., struggling for each breath, speaks in single words)    Negative: Difficult to awaken or acting confused (e.g., disoriented, slurred speech)    Negative: Bluish (or gray) lips or face now    Negative: Shock suspected (e.g., cold/pale/clammy skin, too weak to stand, low BP, rapid pulse)    Negative: Sounds like a life-threatening emergency to the triager    Negative: [1] Diagnosed or suspected COVID-19 AND [2] symptoms lasting 3 or more weeks    Negative: [1] COVID-19 exposure AND [2] no symptoms    Negative: COVID-19 vaccine reaction suspected (e.g., fever, headache, muscle aches) occurring 1 to 3 days after getting vaccine    Negative: COVID-19 vaccine, questions about    Negative: [1] Lives with someone known to have influenza (flu test positive) AND [2] flu-like symptoms (e.g., cough, runny nose, sore throat, SOB; with or without fever)    Negative: [1] Adult with possible COVID-19 symptoms AND [2] triager concerned about severity of symptoms or other causes    Negative: COVID-19 and breastfeeding, questions about    Negative: SEVERE or constant chest pain or pressure  (Exception: Mild central chest pain, present only when coughing.)    Negative:  MODERATE difficulty breathing (e.g., speaks in phrases, SOB even at rest, pulse 100-120)    Negative: [1] Headache AND [2] stiff neck (can't touch chin to chest)    Negative: Oxygen level (e.g., pulse oximetry) 90 percent or lower    Negative: Chest pain or pressure    Negative: Patient sounds very sick or weak to the triager    Negative: MILD difficulty breathing (e.g., minimal/no SOB at rest, SOB with walking, pulse <100)    Negative: Fever > 103 F (39.4 C)    Negative: [1] Fever > 101 F (38.3 C) AND [2] age > 60 years    Negative: [1] Fever > 100.0 F (37.8 C) AND [2] bedridden (e.g., nursing home patient, CVA, chronic illness, recovering from surgery)    Protocols used: CORONAVIRUS (COVID-19) DIAGNOSED OR [id number] 1.18.2022

## 2022-11-07 ENCOUNTER — VIRTUAL VISIT (OUTPATIENT)
Dept: FAMILY MEDICINE | Facility: CLINIC | Age: 57
End: 2022-11-07
Payer: COMMERCIAL

## 2022-11-07 DIAGNOSIS — U07.1 INFECTION DUE TO 2019 NOVEL CORONAVIRUS: Primary | ICD-10-CM

## 2022-11-07 PROCEDURE — 99213 OFFICE O/P EST LOW 20 MIN: CPT | Mod: 95 | Performed by: INTERNAL MEDICINE

## 2022-11-07 NOTE — PROGRESS NOTES
Tracy is a 57 year old who is being evaluated via a billable video visit.      How would you like to obtain your AVS? Mail a copy  If the video visit is dropped, the invitation should be resent by: Text to cell phone: [phone]   Will anyone else be joining your video visit? No          Assessment & Plan     Infection due to 2019 novel coronavirus  Started getting unwell on Thursday  Really the symptoms worsen on Friday  Was having body aches and fever  No diarrhea  Felt nauseous  Low energy levels  Tested positive for COVID  Wants to explore the treatment options with Paxilovid  Discussed the side effects including bitter taste/diarrhea/dizziness/nausea  Also discussed about rebound COVID  She is not on any regular medications so we do not have to worry about medication interactions  Also discussed about quarantine measures  For fever today has come down to 100.3 but yesterday it was at 102  Advised that she can come off quarantine after 5 days if she is fever free but has to wear mask for another extra 5 days  - nirmatrelvir and ritonavir (PAXLOVID) therapy pack; Take 3 tablets by mouth 2 times daily for 5 days (Take 2 Nirmatrelvir tablets and 1 Ritonavir tablet twice daily for 5 days)      20 minutes spent on the date of the encounter doing chart review, history and exam, documentation and further activities per the note           Return in about 4 weeks (around 12/5/2022), or if symptoms worsen or fail to improve.    Vincent Powell MD  [facility]   Tracy is a 57 year old, presenting for the following health issues:  Covid Concern      HPI       COVID-19 Symptom Review  How many days ago did these symptoms start? Between 11/3-11/4    Are any of the following symptoms significant for you?    New or worsening difficulty breathing? No    Worsening cough? No    Fever or chills? Yes, I felt feverish or had chills.    Headache: YES    Sore throat: No    Chest pain:  No    Diarrhea: No    Body aches? YES    What treatments has patient tried? Acetaminophen   Does patient live in a nursing home, group home, or shelter? No  Does patient have a way to get food/medications during quarantined? Yes, I have a friend or family member who can help me.                  Review of Systems   Constitutional, HEENT, cardiovascular, pulmonary, gi and gu systems are negative, except as otherwise noted.      Objective    Vitals - Patient Reported  Pain Score: No Pain (0)        Physical Exam   GENERAL: Healthy, alert and no distress  EYES: Eyes grossly normal to inspection.  No discharge or erythema, or obvious scleral/conjunctival abnormalities.  RESP: No audible wheeze, cough, or visible cyanosis.  No visible retractions or increased work of breathing.    SKIN: Visible skin clear. No significant rash, abnormal pigmentation or lesions.  NEURO: Cranial nerves grossly intact.  Mentation and speech appropriate for age.  PSYCH: Mentation appears normal, affect normal/bright, judgement and insight intact, normal speech and appearance well-groomed.                Video-Visit Details    Video Start Time: 800 AM    Type of service:  Video Visit    Video End Time:8:20 AM    Originating Location (pt. Location): Home        Distant Location (provider location):  On-site    Platform used for Video Visit: Charlie

## 2022-12-25 ENCOUNTER — HEALTH MAINTENANCE LETTER (OUTPATIENT)
Age: 57
End: 2022-12-25

## 2023-06-09 ENCOUNTER — OFFICE VISIT (OUTPATIENT)
Dept: FAMILY MEDICINE | Facility: CLINIC | Age: 58
End: 2023-06-09
Payer: COMMERCIAL

## 2023-06-09 VITALS
TEMPERATURE: 97.8 F | OXYGEN SATURATION: 98 % | RESPIRATION RATE: 20 BRPM | SYSTOLIC BLOOD PRESSURE: 154 MMHG | HEART RATE: 68 BPM | DIASTOLIC BLOOD PRESSURE: 94 MMHG

## 2023-06-09 DIAGNOSIS — S91.332A PUNCTURE WOUND OF LEFT FOOT, INITIAL ENCOUNTER: Primary | ICD-10-CM

## 2023-06-09 PROCEDURE — 90715 TDAP VACCINE 7 YRS/> IM: CPT | Performed by: EMERGENCY MEDICINE

## 2023-06-09 PROCEDURE — 99213 OFFICE O/P EST LOW 20 MIN: CPT | Mod: 25 | Performed by: EMERGENCY MEDICINE

## 2023-06-09 PROCEDURE — 90471 IMMUNIZATION ADMIN: CPT | Performed by: EMERGENCY MEDICINE

## 2023-06-09 RX ORDER — CIPROFLOXACIN 500 MG/1
500 TABLET, FILM COATED ORAL 2 TIMES DAILY
Qty: 14 TABLET | Refills: 0 | Status: SHIPPED | OUTPATIENT
Start: 2023-06-09 | End: 2023-06-16

## 2023-06-09 RX ORDER — CEPHALEXIN 500 MG/1
500 CAPSULE ORAL 4 TIMES DAILY
Qty: 28 CAPSULE | Refills: 0 | Status: SHIPPED | OUTPATIENT
Start: 2023-06-09 | End: 2023-06-16

## 2023-06-09 NOTE — LETTER
June 9, 2023      Tracy Kern  [address] [place] [id number]        To Whom It May Concern:    Tracy Kern was seen in our clinic. She may return to work with the following: no weightbearing for the next 1 week on the left foot      Sincerely,        DANIELLE LARA MD

## 2023-06-09 NOTE — PROGRESS NOTES
Impression:  Puncture wound sole of the left foot with infection.  The nail penetrated through the sole of the rain boot so she is at risk for both staph and strep skin infection as well as Pseudomonas.  Her tetanus is a should status is not up-to-date    Plan:  Tdap was administered in clinic.  Crutches with weightbearing as tolerated.  Soak the foot twice per day.  We will start Keflex for the infection.  We will start her on Cipro but she was cautioned that if she develops the itching reaction similar to what she had with the Levaquin she should stop the Cipro.  Follow-up if the redness is getting worse or not improving on the antibiotics      Chief Complaint:  Patient presents with:  Stepped On A Nail: Left Foot         HPI:   Tracy Kern is a 58 year old female who presents to this clinic for the evaluation of her wound sole of left foot.  Patient stepped on a nail and sustained a puncture wound on the sole of her left foot yesterday.  Today it is swelling and red and painful.  The wound punctured through the bottom of her rain boot and into her foot.  She does not have diabetes.  She was previously treated with Levaquin in the past and had some itching reaction to that but no anaphylaxis.      PMH:   No past medical history on file.  No past surgical history on file.      ROS:  All other systems negative    Meds:    Current Outpatient Medications:      Acetaminophen (TYLENOL ARTHRITIS PAIN PO), , Disp: , Rfl:      cephALEXin (KEFLEX) 500 MG capsule, Take 1 capsule (500 mg) by mouth 4 times daily for 7 days, Disp: 28 capsule, Rfl: 0     ciprofloxacin (CIPRO) 500 MG tablet, Take 1 tablet (500 mg) by mouth 2 times daily for 7 days, Disp: 14 tablet, Rfl: 0     IBUPROFEN PO, , Disp: , Rfl:      omeprazole (PRILOSEC OTC) 20 MG EC tablet, Take 1 tablet (20 mg) by mouth daily, Disp: 30 tablet, Rfl: 0        Social:  Social History     Socioeconomic History     Marital status:      Spouse name: Not on file      Number of children: Not on file     Years of education: Not on file     Highest education level: Not on file   Occupational History     Not on file   Tobacco Use     Smoking status: Never     Smokeless tobacco: Never   Vaping Use     Vaping status: Never Used   Substance and Sexual Activity     Alcohol use: No     Drug use: No     Sexual activity: Yes     Partners: Male   Other Topics Concern     Not on file   Social History Narrative     Not on file     Social Determinants of Health     Financial Resource Strain: Not on file   Food Insecurity: Not on file   Transportation Needs: Not on file   Physical Activity: Not on file   Stress: Not on file   Social Connections: Not on file   Intimate Partner Violence: Not on file   Housing Stability: Not on file         Physical Exam:  Vitals:    06/09/23 1010   BP: (!) 154/94   Pulse: 68   Resp: 20   Temp: 97.8  F (36.6  C)   TempSrc: Oral   SpO2: 98%      Patient is awake, alert, no distress  Extremities: Left foot shows a puncture wound in the mid sole of the left foot with some surrounding erythema and tenderness.  There is no fluctuance or drainage.  Neuro: Normal motor and sensory function in all extremities  Psych: Awake, alert, normally responsive      Results:    No results found for this or any previous visit (from the past 24 hour(s)).           Henry James MD

## 2023-06-14 ENCOUNTER — TELEPHONE (OUTPATIENT)
Dept: FAMILY MEDICINE | Facility: CLINIC | Age: 58
End: 2023-06-14
Payer: COMMERCIAL

## 2023-06-14 NOTE — LETTER
Camila 15, 2023      Tracy Kern  [address] [place] [id number]        To Whom It May Concern:    Tracy Kern was seen on 6/9/23. She may return to work on 6/17/23 without restriction.         Sincerely,        Krysten Arguelles PA-C

## 2023-06-14 NOTE — TELEPHONE ENCOUNTER
Patient calling to request a note to return to work due.    Patient is looking to go back on Friday     Patient requesting a note to go back to work with no restrictions     -Call back patient when done so she can  the note     [phone] (Mobile)

## 2023-06-16 ENCOUNTER — TELEPHONE (OUTPATIENT)
Dept: FAMILY MEDICINE | Facility: CLINIC | Age: 58
End: 2023-06-16
Payer: COMMERCIAL

## 2023-06-16 NOTE — TELEPHONE ENCOUNTER
Received call from Patient.  Patient states that she has appointment on Monday and states that she hasn''t seen a Dr for anything since covid started.  Wanting to change appointment to annual.  Patient needs referral for mammogram.  Aurelio Duarte RN

## 2023-06-19 ENCOUNTER — OFFICE VISIT (OUTPATIENT)
Dept: FAMILY MEDICINE | Facility: CLINIC | Age: 58
End: 2023-06-19
Payer: COMMERCIAL

## 2023-06-19 VITALS
HEIGHT: 63 IN | DIASTOLIC BLOOD PRESSURE: 78 MMHG | WEIGHT: 262 LBS | BODY MASS INDEX: 46.42 KG/M2 | HEART RATE: 66 BPM | OXYGEN SATURATION: 97 % | TEMPERATURE: 97 F | SYSTOLIC BLOOD PRESSURE: 130 MMHG

## 2023-06-19 DIAGNOSIS — Z12.4 CERVICAL CANCER SCREENING: ICD-10-CM

## 2023-06-19 DIAGNOSIS — Z00.00 ROUTINE GENERAL MEDICAL EXAMINATION AT A HEALTH CARE FACILITY: Primary | ICD-10-CM

## 2023-06-19 DIAGNOSIS — Z11.59 NEED FOR HEPATITIS C SCREENING TEST: ICD-10-CM

## 2023-06-19 DIAGNOSIS — Z12.31 VISIT FOR SCREENING MAMMOGRAM: ICD-10-CM

## 2023-06-19 DIAGNOSIS — M54.50 CHRONIC BILATERAL LOW BACK PAIN WITHOUT SCIATICA: ICD-10-CM

## 2023-06-19 DIAGNOSIS — G89.29 CHRONIC BILATERAL LOW BACK PAIN WITHOUT SCIATICA: ICD-10-CM

## 2023-06-19 DIAGNOSIS — S91.332A PUNCTURE WOUND OF LEFT FOOT EXCLUDING TOES WITHOUT COMPLICATION, INITIAL ENCOUNTER: ICD-10-CM

## 2023-06-19 LAB
ALBUMIN SERPL BCG-MCNC: 4.1 G/DL (ref 3.5–5.2)
ALP SERPL-CCNC: 105 U/L (ref 35–104)
ALT SERPL W P-5'-P-CCNC: 23 U/L (ref 0–50)
ANION GAP SERPL CALCULATED.3IONS-SCNC: 8 MMOL/L (ref 7–15)
AST SERPL W P-5'-P-CCNC: 28 U/L (ref 0–45)
BASOPHILS # BLD AUTO: 0 10E3/UL (ref 0–0.2)
BASOPHILS NFR BLD AUTO: 0 %
BILIRUB SERPL-MCNC: 0.3 MG/DL
BUN SERPL-MCNC: 10.5 MG/DL (ref 6–20)
CALCIUM SERPL-MCNC: 9 MG/DL (ref 8.6–10)
CHLORIDE SERPL-SCNC: 108 MMOL/L (ref 98–107)
CHOLEST SERPL-MCNC: 161 MG/DL
CREAT SERPL-MCNC: 0.84 MG/DL (ref 0.51–0.95)
DEPRECATED HCO3 PLAS-SCNC: 25 MMOL/L (ref 22–29)
EOSINOPHIL # BLD AUTO: 0.1 10E3/UL (ref 0–0.7)
EOSINOPHIL NFR BLD AUTO: 2 %
ERYTHROCYTE [DISTWIDTH] IN BLOOD BY AUTOMATED COUNT: 15.1 % (ref 10–15)
GFR SERPL CREATININE-BSD FRML MDRD: 81 ML/MIN/1.73M2
GLUCOSE SERPL-MCNC: 97 MG/DL (ref 70–99)
HCT VFR BLD AUTO: 41.2 % (ref 35–47)
HCV AB SERPL QL IA: NONREACTIVE
HDLC SERPL-MCNC: 72 MG/DL
HGB BLD-MCNC: 13 G/DL (ref 11.7–15.7)
IMM GRANULOCYTES # BLD: 0 10E3/UL
IMM GRANULOCYTES NFR BLD: 0 %
LDLC SERPL CALC-MCNC: 76 MG/DL
LYMPHOCYTES # BLD AUTO: 1.8 10E3/UL (ref 0.8–5.3)
LYMPHOCYTES NFR BLD AUTO: 42 %
MCH RBC QN AUTO: 27 PG (ref 26.5–33)
MCHC RBC AUTO-ENTMCNC: 31.6 G/DL (ref 31.5–36.5)
MCV RBC AUTO: 86 FL (ref 78–100)
MONOCYTES # BLD AUTO: 0.3 10E3/UL (ref 0–1.3)
MONOCYTES NFR BLD AUTO: 7 %
NEUTROPHILS # BLD AUTO: 2 10E3/UL (ref 1.6–8.3)
NEUTROPHILS NFR BLD AUTO: 48 %
NONHDLC SERPL-MCNC: 89 MG/DL
PLATELET # BLD AUTO: 163 10E3/UL (ref 150–450)
POTASSIUM SERPL-SCNC: 4.1 MMOL/L (ref 3.4–5.3)
PROT SERPL-MCNC: 7.4 G/DL (ref 6.4–8.3)
RBC # BLD AUTO: 4.81 10E6/UL (ref 3.8–5.2)
SODIUM SERPL-SCNC: 141 MMOL/L (ref 136–145)
TRIGL SERPL-MCNC: 65 MG/DL
WBC # BLD AUTO: 4.1 10E3/UL (ref 4–11)

## 2023-06-19 PROCEDURE — 80061 LIPID PANEL: CPT | Performed by: PHYSICIAN ASSISTANT

## 2023-06-19 PROCEDURE — 80053 COMPREHEN METABOLIC PANEL: CPT | Performed by: PHYSICIAN ASSISTANT

## 2023-06-19 PROCEDURE — 85025 COMPLETE CBC W/AUTO DIFF WBC: CPT | Performed by: PHYSICIAN ASSISTANT

## 2023-06-19 PROCEDURE — 99396 PREV VISIT EST AGE 40-64: CPT | Performed by: PHYSICIAN ASSISTANT

## 2023-06-19 PROCEDURE — 99213 OFFICE O/P EST LOW 20 MIN: CPT | Mod: 25 | Performed by: PHYSICIAN ASSISTANT

## 2023-06-19 PROCEDURE — 86803 HEPATITIS C AB TEST: CPT | Performed by: PHYSICIAN ASSISTANT

## 2023-06-19 PROCEDURE — 36415 COLL VENOUS BLD VENIPUNCTURE: CPT | Performed by: PHYSICIAN ASSISTANT

## 2023-06-19 PROCEDURE — 87624 HPV HI-RISK TYP POOLED RSLT: CPT | Performed by: PHYSICIAN ASSISTANT

## 2023-06-19 PROCEDURE — G0145 SCR C/V CYTO,THINLAYER,RESCR: HCPCS | Performed by: PHYSICIAN ASSISTANT

## 2023-06-19 NOTE — LETTER
June 19, 2023      Tracy Kern  [address] [place] [id number]        To Whom It May Concern:    Tracy Kern was in clinic today for follow up regarding a puncture wound of the left foot sustained on 6/8/23. She was initially evaluated for this wound on 6/9/23. As the wound is still open and she is unable to bear full weight on the foot at this time, she is unable to return to work. She has been advised to not work through 6/26/23 at which time we will re evaluate the wound and ability return to work      Sincerely,        Mikayla Brewer PA-C

## 2023-06-19 NOTE — LETTER
"June 23, 2023      Tracy Kern  [address] [place] [id number]        Dear ,    We are writing to inform you of your test results.    The basic panel (kidney function, liver function, electrolytes) is normal     The CBC (white count, hemoglobin, platelets) is normal     The cholesterol numbers are as follows:     Your total cholesterol  should be less than 200.   Your total is 161     The total cholesterol is made up of your HDL and LDL.     The HDL is the \"good\" cholesterol and when it is high (over 60), it decreases the risk for heart attack and stroke.  When HDL is less than 40, it raises the risk for these problems.  You can raise the HDL by eating fish and by performing regular aerobic exercise (e.g. running, biking, swimming, aerobics).   Your HDL is 72     The LDL is the \"bad\" cholesterol linked to heart disease and stroke. For those who have heart disease or diabetes, their LDL should be less than 100.  For all others, the LDL should be less than 160.  You can lower this by following a low fat and low cholesterol diet.   YOur LDL is 76     Your triglycerides should be less than 150.  You can lower these with a low fat diet, and for those with diabetes, by improving blood sugar control.   Your triglycerides are 65     VINOD CruzC/am            Resulted Orders   Hepatitis C Screen Reflex to HCV RNA Quant and Genotype   Result Value Ref Range    Hepatitis C Antibody Nonreactive Nonreactive    Narrative    Assay performance characteristics have not been established for newborns, infants, and children.   Lipid panel reflex to direct LDL Fasting   Result Value Ref Range    Cholesterol 161 <200 mg/dL    Triglycerides 65 <150 mg/dL    Direct Measure HDL 72 >=50 mg/dL    LDL Cholesterol Calculated 76 <=100 mg/dL    Non HDL Cholesterol 89 <130 mg/dL    Narrative    Cholesterol  Desirable:  <200 mg/dL    Triglycerides  Normal:  Less than 150 mg/dL  Borderline High:  150-199 mg/dL  High:  200-499 " mg/dL  Very High:  Greater than or equal to 500 mg/dL    Direct Measure HDL  Female:  Greater than or equal to 50 mg/dL   Male:  Greater than or equal to 40 mg/dL    LDL Cholesterol  Desirable:  <100mg/dL  Above Desirable:  100-129 mg/dL   Borderline High:  130-159 mg/dL   High:  160-189 mg/dL   Very High:  >= 190 mg/dL    Non HDL Cholesterol  Desirable:  130 mg/dL  Above Desirable:  130-159 mg/dL  Borderline High:  160-189 mg/dL  High:  190-219 mg/dL  Very High:  Greater than or equal to 220 mg/dL   Comprehensive metabolic panel (BMP + Alb, Alk Phos, ALT, AST, Total. Bili, TP)   Result Value Ref Range    Sodium 141 136 - 145 mmol/L    Potassium 4.1 3.4 - 5.3 mmol/L    Chloride 108 (H) 98 - 107 mmol/L    Carbon Dioxide (CO2) 25 22 - 29 mmol/L    Anion Gap 8 7 - 15 mmol/L    Urea Nitrogen 10.5 6.0 - 20.0 mg/dL    Creatinine 0.84 0.51 - 0.95 mg/dL    Calcium 9.0 8.6 - 10.0 mg/dL    Glucose 97 70 - 99 mg/dL    Alkaline Phosphatase 105 (H) 35 - 104 U/L    AST 28 0 - 45 U/L      Comment:      Reference intervals for this test were updated on 6/12/2023 to more accurately reflect our healthy population. There may be differences in the flagging of prior results with similar values performed with this method. Interpretation of those prior results can be made in the context of the updated reference intervals.    ALT 23 0 - 50 U/L      Comment:      Reference intervals for this test were updated on 6/12/2023 to more accurately reflect our healthy population. There may be differences in the flagging of prior results with similar values performed with this method. Interpretation of those prior results can be made in the context of the updated reference intervals.      Protein Total 7.4 6.4 - 8.3 g/dL    Albumin 4.1 3.5 - 5.2 g/dL    Bilirubin Total 0.3 <=1.2 mg/dL    GFR Estimate 81 >60 mL/min/1.73m2      Comment:      eGFR calculated using 2021 CKD-EPI equation.   CBC with platelets and differential   Result Value Ref Range     WBC Count 4.1 4.0 - 11.0 10e3/uL    RBC Count 4.81 3.80 - 5.20 10e6/uL    Hemoglobin 13.0 11.7 - 15.7 g/dL    Hematocrit 41.2 35.0 - 47.0 %    MCV 86 78 - 100 fL    MCH 27.0 26.5 - 33.0 pg    MCHC 31.6 31.5 - 36.5 g/dL    RDW 15.1 (H) 10.0 - 15.0 %    Platelet Count 163 150 - 450 10e3/uL    % Neutrophils 48 %    % Lymphocytes 42 %    % Monocytes 7 %    % Eosinophils 2 %    % Basophils 0 %    % Immature Granulocytes 0 %    Absolute Neutrophils 2.0 1.6 - 8.3 10e3/uL    Absolute Lymphocytes 1.8 0.8 - 5.3 10e3/uL    Absolute Monocytes 0.3 0.0 - 1.3 10e3/uL    Absolute Eosinophils 0.1 0.0 - 0.7 10e3/uL    Absolute Basophils 0.0 0.0 - 0.2 10e3/uL    Absolute Immature Granulocytes 0.0 <=0.4 10e3/uL

## 2023-06-19 NOTE — PROGRESS NOTES
SUBJECTIVE:   CC: Tracy is an 58 year old who presents for preventive health visit.        View : No data to display.              Healthy Habits:    Getting at least 3 servings of Calcium per day:  NO    Bi-annual eye exam:  NO    Dental care twice a year:  Yes    Sleep apnea or symptoms of sleep apnea:  Excessive snoring    Diet:  Regular (no restrictions)    Frequency of exercise:  2-3 days/week    Duration of exercise:  30-45 minutes    Taking medications regularly:  Yes    Barriers to taking medications:  None    Medication side effects:  None    PHQ-2 Total Score:    Additional concerns today:  Yes    -She needs a note to return to work after having an injury to her left foot.  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 Wondering if she can have a script/order for it so she can use her HSA account for reimbursement        Social History     Tobacco Use     Smoking status: Never     Smokeless tobacco: Never   Vaping Use     Vaping status: Never Used   Substance Use Topics     Alcohol use: No              View : No data to display.              Reviewed orders with patient.  Reviewed health maintenance and updated orders accordingly - Yes  BP Readings from Last 3 Encounters:   06/19/23 130/78   06/09/23 (!) 154/94   01/07/20 123/78    Wt Readings from Last 3 Encounters:   06/19/23 118.8 kg (262 lb)   01/07/20 106 kg (233 lb 11.2 oz)   12/28/19 105.6 kg (232 lb 11.2 oz)                    Breast Cancer Screening:    FHS-7:        View : No data to display.                  Pertinent  "mammograms are reviewed under the imaging tab.    History of abnormal Pap smear: NO - age 30-65 PAP every 5 years with negative HPV co-testing recommended      6/27/2016     3:22 PM   PAP / HPV   PAP Negative for squamous intraepithelial lesion or malignancy  Electronically signed by Christiana Guillory CT (ASCP) on 7/1/2016 at  2:22 PM         Reviewed and updated as needed this visit by clinical staff    Allergies  Meds              Reviewed and updated as needed this visit by Provider                     Review of Systems  CONSTITUTIONAL: NEGATIVE for fever, chills, change in weight  INTEGUMENTARU/SKIN: NEGATIVE for worrisome rashes, moles or lesions  EYES: NEGATIVE for vision changes or irritation  ENT: NEGATIVE for ear, mouth and throat problems  RESP: NEGATIVE for significant cough or SOB  BREAST: NEGATIVE for masses, tenderness or discharge  CV: NEGATIVE for chest pain, palpitations or peripheral edema  GI: NEGATIVE for nausea, abdominal pain, heartburn, or change in bowel habits  : NEGATIVE for unusual urinary or vaginal symptoms. Periods are regular.  MUSCULOSKELETAL: NEGATIVE for significant arthralgias or myalgia  NEURO: NEGATIVE for weakness, dizziness or paresthesias  PSYCHIATRIC: NEGATIVE for changes in mood or affect     OBJECTIVE:   /78   Pulse 66   Temp 97  F (36.1  C) (Tympanic)   Ht 1.6 m (5' 3\")   Wt 118.8 kg (262 lb)   SpO2 97%   BMI 46.41 kg/m    Physical Exam  GENERAL: healthy, alert and no distress  EYES: Eyes grossly normal to inspection, PERRL and conjunctivae and sclerae normal  HENT: ear canals and TM's normal, nose and mouth without ulcers or lesions  NECK: no adenopathy, no asymmetry, masses, or scars and thyroid normal to palpation  RESP: lungs clear to auscultation - no rales, rhonchi or wheezes  BREAST: normal without masses, tenderness or nipple discharge and no palpable axillary masses or adenopathy  CV: regular rate and rhythm, normal S1 S2, no S3 or S4, no " murmur, click or rub, no peripheral edema and peripheral pulses strong  ABDOMEN: soft, nontender, no hepatosplenomegaly, no masses and bowel sounds normal   (female): normal female external genitalia, normal urethral meatus, vaginal mucosa pink, moist, well rugated, and normal cervix/adnexa/uterus without masses or discharge  MS: no gross musculoskeletal defects noted, no edema  SKIN: no suspicious lesions or rashes  Open puncture wound over bottom of left foot. No redness. No drainage to express. Tender with palpation around the opening but no palpable mass or firm area to suggest abscess or pocket of infection  NEURO: Normal strength and tone, mentation intact and speech normal  PSYCH: mentation appears normal, affect normal/bright    Diagnostic Test Results:  pending    ASSESSMENT/PLAN:   (Z00.00) Routine general medical examination at a health care facility  (primary encounter diagnosis)  Comment:   Plan: Lipid panel reflex to direct LDL Fasting,         Comprehensive metabolic panel (BMP + Alb, Alk         Phos, ALT, AST, Total. Bili, TP), CBC with         platelets and differential            (Z11.59) Need for hepatitis C screening test  Comment:   Plan: Hepatitis C Screen Reflex to HCV RNA Quant and         Genotype            (Z12.4) Cervical cancer screening  Comment:   Plan: Pap Screen with HPV - recommended age 30 - 65         years            (Z12.31) Visit for screening mammogram  Comment:   Plan: MA SCREENING DIGITAL BILAT - Future  (s+30)            (M54.50,  G89.29) Chronic bilateral low back pain without sciatica  Comment:   Plan: Miscellaneous Order for DME - ONLY FOR DME            (S91.332A) Puncture wound of left foot excluding toes without complication, initial encounter  Comment: site itself healing well. No palpable abscess over area. No drainage. No redness. Still with open wound  Plan: Letter written to be off from work for another week given she is still not able to fully bear weight  without pain  Scheduled a follow up next Monday - if she is feeling better and able to return to work can cancel appt and I can write letter  If still sore and unable to work will be seen in clinic          Patient has been advised of split billing requirements and indicates understanding: Yes      COUNSELING:  Reviewed preventive health counseling, as reflected in patient instructions        She reports that she has never smoked. She has never used smokeless tobacco.      Mikayla Brewer PA-C  [facility]

## 2023-06-21 LAB
BKR LAB AP GYN ADEQUACY: NORMAL
BKR LAB AP GYN INTERPRETATION: NORMAL
BKR LAB AP HPV REFLEX: NORMAL
BKR LAB AP PREVIOUS ABNORMAL: NORMAL
PATH REPORT.COMMENTS IMP SPEC: NORMAL
PATH REPORT.COMMENTS IMP SPEC: NORMAL
PATH REPORT.RELEVANT HX SPEC: NORMAL

## 2023-06-23 LAB
HUMAN PAPILLOMA VIRUS 16 DNA: NEGATIVE
HUMAN PAPILLOMA VIRUS 18 DNA: NEGATIVE
HUMAN PAPILLOMA VIRUS FINAL DIAGNOSIS: NORMAL
HUMAN PAPILLOMA VIRUS OTHER HR: NEGATIVE

## 2023-06-26 ENCOUNTER — OFFICE VISIT (OUTPATIENT)
Dept: FAMILY MEDICINE | Facility: CLINIC | Age: 58
End: 2023-06-26
Payer: COMMERCIAL

## 2023-06-26 VITALS
HEIGHT: 63 IN | TEMPERATURE: 97.3 F | HEART RATE: 72 BPM | DIASTOLIC BLOOD PRESSURE: 78 MMHG | OXYGEN SATURATION: 98 % | WEIGHT: 262 LBS | BODY MASS INDEX: 46.42 KG/M2 | SYSTOLIC BLOOD PRESSURE: 132 MMHG

## 2023-06-26 DIAGNOSIS — S91.332D: Primary | ICD-10-CM

## 2023-06-26 PROCEDURE — 99213 OFFICE O/P EST LOW 20 MIN: CPT | Performed by: PHYSICIAN ASSISTANT

## 2023-06-26 NOTE — LETTER
June 26, 2023      Tracy Kern  [address] [place] [address]        To Whom It May Concern:    Tracy Kern was in clinic today for follow up regarding a puncture wound of the left foot sustained on 6/8/23. She was initially evaluated for this wound on 6/9/23. While the wound is healing well, it is still not fully healed and risk for injury or infection is still high. For that reason she is advised to remain off work for one more week (through 7/2/2023).     She is able to return to work with NO RESTRICTIONS on Monday, July 3, 2023      Sincerely,        Mikayla Brewer PA-C

## 2023-06-26 NOTE — PROGRESS NOTES
"  Assessment & Plan       ICD-10-CM    1. Puncture wound of left foot excluding toes without complication, subsequent encounter  S91.332D         Healing well - closed now externally   No evidence of infection today    with palpation and thickened tissue which will likely be present for awhile. Discussed that there will likely be some soreness and tenderness in the area for awhile  So as to make sure it is healed to maximal potential, will keep her off work for 1 more week abut can return in 1 week without restrictions      BMI:   Estimated body mass index is 46.41 kg/m  as calculated from the following:    Height as of this encounter: 1.6 m (5' 3\").    Weight as of this encounter: 118.8 kg (262 lb).           Mikayla Brewer PA-C  [facility] MEDARDO Molina is a 58 year old, presenting for the following health issues:  Follow Up         No data to display              HPI       Patient is here today to follow up with her foot injury.    -She also wants to go over her labs that she had done on 6/19.    Foot is better than it was although still painful to put full pressure on the foot so still favoring the outside of her foot  Having sharp 'stabs' of pain when she walks with pressure directly on the area  No redness  Foot is not swollen  She has been soaking it  Completed the full course of antibiotics    Review of Systems   Remainder of ROS obtained and found to be negative other than that which was documented above        Objective    /78   Pulse 72   Temp 97.3  F (36.3  C) (Tympanic)   Ht 1.6 m (5' 3\")   Wt 118.8 kg (262 lb)   SpO2 98%   BMI 46.41 kg/m    Body mass index is 46.41 kg/m .  Physical Exam   GENERAL: healthy, alert and no distress  FOOT: no redness around the puncture wound. Site is closed from the outside. Skin underneath is thicker and  witih direct pressure    Diagnostic Tests  Reviewed recent lab results with patient          "
5

## 2023-07-12 ENCOUNTER — ANCILLARY PROCEDURE (OUTPATIENT)
Dept: MAMMOGRAPHY | Facility: CLINIC | Age: 58
End: 2023-07-12
Attending: PHYSICIAN ASSISTANT
Payer: COMMERCIAL

## 2023-07-12 ENCOUNTER — ANCILLARY ORDERS (OUTPATIENT)
Dept: FAMILY MEDICINE | Facility: CLINIC | Age: 58
End: 2023-07-12

## 2023-07-12 DIAGNOSIS — Z12.31 VISIT FOR SCREENING MAMMOGRAM: ICD-10-CM

## 2023-07-12 PROCEDURE — 77067 SCR MAMMO BI INCL CAD: CPT

## 2023-07-13 ENCOUNTER — ANCILLARY ORDERS (OUTPATIENT)
Dept: FAMILY MEDICINE | Facility: CLINIC | Age: 58
End: 2023-07-13

## 2023-07-13 DIAGNOSIS — R92.0 MICROCALCIFICATION OF RIGHT BREAST ON MAMMOGRAM: ICD-10-CM

## 2023-07-21 ENCOUNTER — ANCILLARY PROCEDURE (OUTPATIENT)
Dept: MAMMOGRAPHY | Facility: CLINIC | Age: 58
End: 2023-07-21
Attending: PHYSICIAN ASSISTANT
Payer: COMMERCIAL

## 2023-07-21 ENCOUNTER — ANCILLARY ORDERS (OUTPATIENT)
Dept: FAMILY MEDICINE | Facility: CLINIC | Age: 58
End: 2023-07-21

## 2023-07-21 DIAGNOSIS — R92.0 MICROCALCIFICATION OF RIGHT BREAST ON MAMMOGRAM: ICD-10-CM

## 2023-07-21 PROCEDURE — 77065 DX MAMMO INCL CAD UNI: CPT | Mod: RT

## 2023-08-02 ENCOUNTER — OFFICE VISIT (OUTPATIENT)
Dept: FAMILY MEDICINE | Facility: CLINIC | Age: 58
End: 2023-08-02
Payer: COMMERCIAL

## 2023-08-02 VITALS
OXYGEN SATURATION: 97 % | DIASTOLIC BLOOD PRESSURE: 92 MMHG | BODY MASS INDEX: 45.66 KG/M2 | RESPIRATION RATE: 16 BRPM | HEART RATE: 59 BPM | TEMPERATURE: 97.7 F | WEIGHT: 257.7 LBS | SYSTOLIC BLOOD PRESSURE: 150 MMHG | HEIGHT: 63 IN

## 2023-08-02 DIAGNOSIS — S91.332D: Primary | ICD-10-CM

## 2023-08-02 PROCEDURE — 99213 OFFICE O/P EST LOW 20 MIN: CPT | Performed by: FAMILY MEDICINE

## 2023-08-02 NOTE — PROGRESS NOTES
"  Assessment & Plan     Puncture wound of left foot excluding toes without complication, subsequent encounter  This is healing but slowly it healed by secondary intent   She did take the post op shoe which she found more comfortable   This ay take a little longer than she was expecting      BMI:   Estimated body mass index is 45.66 kg/m  as calculated from the following:    Height as of this encounter: 1.6 m (5' 2.99\").    Weight as of this encounter: 116.9 kg (257 lb 11.2 oz).       Rtc of worsening or not improving     Sara Hinojosa MD  [facility] MEDARDO Molina is a 58 year old, presenting for the following health issues:  Foot Pain (Left foot pain)         No data to display                HPI     Follow up on left foot pain, states she has been seen 2 times in the past but pain is not gettting any better it has progressively been getting worse    The wound has healed up and the callous over it is very hard and she is feeling like nit is stabbing at times she takes Advil and the pain will go away for a little while but then it comes back.  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  There has been no more drainage and she is able to put weight on it it just hurts.      Review of Systems   Constitutional, HEENT, cardiovascular, pulmonary, gi and gu systems are negative, except as otherwise noted.      Objective    BP (!) 150/92   Pulse 59   Temp 97.7  F (36.5  C) (Tympanic)   Resp 16   Ht 1.6 m (5' 2.99\")   Wt 116.9 kg (257 lb 11.2 oz)   SpO2 97%   BMI 45.66 kg/m    Body mass index is 45.66 kg/m .  Physical Exam   GENERAL: healthy, alert and no distress  SKIN: no suspicious lesions or rashes and firm callus/scab over the area there is no surrounding erythema.  I did get a #10 blade scalpel and gently unroofed the hard callus.  It appears almost like a corn.    Sara Hinojosa M.D.                    "

## 2024-04-23 ENCOUNTER — OFFICE VISIT (OUTPATIENT)
Dept: FAMILY MEDICINE | Facility: CLINIC | Age: 59
End: 2024-04-23
Payer: COMMERCIAL

## 2024-04-23 VITALS
DIASTOLIC BLOOD PRESSURE: 84 MMHG | OXYGEN SATURATION: 96 % | BODY MASS INDEX: 45.96 KG/M2 | WEIGHT: 259.4 LBS | SYSTOLIC BLOOD PRESSURE: 124 MMHG | TEMPERATURE: 97.2 F | HEART RATE: 69 BPM

## 2024-04-23 DIAGNOSIS — M25.511 RIGHT ANTERIOR SHOULDER PAIN: Primary | ICD-10-CM

## 2024-04-23 DIAGNOSIS — E66.01 MORBID OBESITY (H): ICD-10-CM

## 2024-04-23 DIAGNOSIS — M75.101 ROTATOR CUFF SYNDROME, RIGHT: ICD-10-CM

## 2024-04-23 PROBLEM — M54.9 BACKACHE: Status: RESOLVED | Noted: 2020-10-28 | Resolved: 2024-04-23

## 2024-04-23 PROCEDURE — 99213 OFFICE O/P EST LOW 20 MIN: CPT | Performed by: FAMILY MEDICINE

## 2024-04-23 NOTE — PROGRESS NOTES
"  Assessment & Plan     (M25.511) Right anterior shoulder pain  (primary encounter diagnosis)  Comment: no xray on site today. Will return for xray tomorrow   Plan: XR Shoulder Right G/E 3 Views, Physical Therapy         Referral            (M75.101) Rotator cuff syndrome, right  Comment: exam is consistent with non traumatic rotator cuff dysfunction. Discussed diagnosis and treatment with her. Referral made to PT. All questions answered. The patient indicates understanding of these issues and agrees with the plan.   Plan: Physical Therapy  Referral            (E66.01) Morbid obesity (H)  Comment: wants to be more active   Plan:       Discussed vaccinations - she will hold off for today       BMI  Estimated body mass index is 45.96 kg/m  as calculated from the following:    Height as of 8/2/23: 1.6 m (5' 2.99\").    Weight as of this encounter: 117.7 kg (259 lb 6.4 oz).   Weight management plan: Discussed healthy diet and exercise guidelines  \    Subjective   Tracy is a 59 year old, presenting for the following health issues:  Arm Pain        4/23/2024     9:41 AM   Additional Questions   Roomed by YESSY Joseph   Accompanied by self     Arm Pain    History of Present Illness       Reason for visit:  My Right upper arm and should hurts  Symptom onset:  More than a month  Symptoms include:  Pain to to tge rt upper arm  Symptom intensity:  Moderate  Symptom progression:  Worsening  Had these symptoms before:  Yes  Has tried/received treatment for these symptoms:  No  What makes it worse:  Over usage of the arm  What makes it better:  Ace wrap , Biofreeze gel    She eats 2-3 servings of fruits and vegetables daily.She consumes 0 sweetened beverage(s) daily.She exercises with enough effort to increase her heart rate 20 to 29 minutes per day.  She exercises with enough effort to increase her heart rate 3 or less days per week.   She is taking medications regularly.     Right shoulder pain x 9 months "   Stabbing and squeezing pain, hard to lift her arm above her head  Pain radiates into elbow  No injury     Right handed       Review of Systems  Constitutional, neuro, ENT, endocrine, pulmonary, cardiac, gastrointestinal, genitourinary, musculoskeletal, integument and psychiatric systems are negative, except as otherwise noted.      Objective    /84   Pulse 69   Temp 97.2  F (36.2  C) (Tympanic)   Wt 117.7 kg (259 lb 6.4 oz)   SpO2 96%   BMI 45.96 kg/m    Body mass index is 45.96 kg/m .  Physical Exam   GENERAL - Pt is alert and oriented in no acute distress.  Affect is appropriate. Good eye contact.  SKIN - no obvious rashes or lesions  Neck shows full ROM. UE strength 5/5, full ROM. Normal abduction to 180 degrees but painful above 90 degrees. External rotation to 90 degrees - no pain.  internal rotation - painful. supraspinatus testing - painful. No pain over AC joint and negative cross arm test.  No tenderness over biceps tendon.   No redness, swelling, warmth.       Xray ordered for tomorrow - no tech here today             Signed Electronically by: Denae Riojas MD

## 2024-04-24 ENCOUNTER — ANCILLARY PROCEDURE (OUTPATIENT)
Dept: GENERAL RADIOLOGY | Facility: CLINIC | Age: 59
End: 2024-04-24
Attending: FAMILY MEDICINE
Payer: COMMERCIAL

## 2024-04-24 DIAGNOSIS — M25.511 RIGHT ANTERIOR SHOULDER PAIN: ICD-10-CM

## 2024-04-24 PROCEDURE — 73030 X-RAY EXAM OF SHOULDER: CPT | Mod: TC | Performed by: RADIOLOGY

## 2024-05-08 ENCOUNTER — THERAPY VISIT (OUTPATIENT)
Dept: PHYSICAL THERAPY | Facility: CLINIC | Age: 59
End: 2024-05-08
Attending: FAMILY MEDICINE
Payer: COMMERCIAL

## 2024-05-08 DIAGNOSIS — M75.101 ROTATOR CUFF SYNDROME, RIGHT: ICD-10-CM

## 2024-05-08 DIAGNOSIS — M25.511 RIGHT ANTERIOR SHOULDER PAIN: ICD-10-CM

## 2024-05-08 PROCEDURE — 97110 THERAPEUTIC EXERCISES: CPT | Mod: GP | Performed by: PHYSICAL THERAPIST

## 2024-05-08 PROCEDURE — 97162 PT EVAL MOD COMPLEX 30 MIN: CPT | Mod: GP | Performed by: PHYSICAL THERAPIST

## 2024-05-08 NOTE — PROGRESS NOTES
PHYSICAL THERAPY EVALUATION  Type of Visit: Evaluation    See electronic medical record for Abuse and Falls Screening details.    Subjective       Presenting condition or subjective complaint: right shoulder pain, sometimes hurts into elbow area and aometimes just up in shoulder, some days doesn't feel pain and some days cannot reach up to shoulder level  Date of onset:      Relevant medical history: Arthritis; Menopause; Migraines or headaches; Overweight   Dates & types of surgery: 6/8/2023 had puncture wound from nail to foot and then had injection after that and has pain in the arm since    Prior diagnostic imaging/testing results:       Prior therapy history for the same diagnosis, illness or injury: No      Prior Level of Function  Transfers:   Ambulation:   ADL:   IADL:     Living Environment  Social support: With a significant other or spouse   Type of home: House   Stairs to enter the home: No       Ramp: No   Stairs inside the home: Yes       Help at home:    Equipment owned:       Employment: Yes nurse  Hobbies/Interests: gardening and cooking    Patient goals for therapy: full range of motion    Pain assessment: Pain present     Objective   SHOULDER EVALUATION  PAIN: Pain is Exacerbated By: reaching - on days that it hurts  INTEGUMENTARY (edema, incisions):   POSTURE: Sitting Posture: Rounded shoulders  GAIT:   Weightbearing Status:   Assistive Device(s):   Gait Deviations:   BALANCE/PROPRIOCEPTION:   WEIGHTBEARING ALIGNMENT:   ROM:   (Degrees) Left AROM Left PROM Right AROM  Right PROM   Shoulder Flexion 160  145 145   Shoulder Extension       Shoulder Abduction       Shoulder Adduction       Shoulder Internal Rotation L1  L1 80   Shoulder External Rotation    90   Shoulder Horizontal Abduction    WNL   Shoulder Horizontal Adduction       Shoulder Flexion ER       Shoulder Flexion IR       Elbow Extension       Elbow Flexion       Pain: pain at end-range flexion, pain with active ER  End feel:      STRENGTH:   Pain: - none + mild ++ moderate +++ severe  Strength Scale: 0-5/5 Left Right   Shoulder Flexion 5 5   Shoulder Extension     Shoulder Abduction 5 5   Shoulder Adduction     Shoulder Internal Rotation 5 5   Shoulder External Rotation 5 4+  +   Shoulder Horizontal Abduction     Shoulder Horizontal Adduction     Elbow Flexion     Elbow Extension     Mid Trap     Lower Trap     Rhomboid     Serratus Anterior       FLEXIBILITY:   SPECIAL TESTS:    Left Right   Impingement     Neer's  Positive   Hawkin's-Pj  Negative    Coracoid Impingement     Internal impingement     Labral     Anterior Slide     [place]'s     Crank     Instability     Apprehension (anterior)     Relocation (anterior)     Anterior Load & Shift     Posterior Load & Shift     Posterior instability (with 90 degrees flex)     Multi-Directional Instability      Sulcus     Biceps      Speed's     Rotator Cuff Tear     Drop Arm  Negative    Belly Press     Lift off        PALPATION:   + Tenderness At Location Left Right   Clavicle  -   Sternoclavicular  -   Acromioclavicular  -   Biceps  +   Triceps     Supraspinatus  +   Infraspinatus  +   Teres minor  -   Subscapularis  -   Deltoid  +   Levator     Rhomboids     Upper trap     Incisional     Bicipital groove  +     JOINT MOBILITY:   CERVICAL SCREEN:     Assessment & Plan   CLINICAL IMPRESSIONS  Medical Diagnosis: Right anterior shoulder pain    Treatment Diagnosis: Right anterior shoulder pain   Impression/Assessment: Patient is a 59 year old female with right shoulder complaints.  The following significant findings have been identified: Pain, Decreased ROM/flexibility, Decreased strength, and Decreased activity tolerance. These impairments interfere with their ability to perform work tasks and household chores as compared to previous level of function.     Clinical Decision Making (Complexity):  Clinical Presentation: Evolving/Changing  Clinical Presentation Rationale: based on medical  and personal factors listed in PT evaluation  Clinical Decision Making (Complexity): Moderate complexity    PLAN OF CARE  Treatment Interventions:  Interventions: Manual Therapy, Neuromuscular Re-education, Therapeutic Exercise    Long Term Goals     PT Goal 1  Goal Description: Tracy will have unresstricted reaching without pain  Rationale: to maximize safety and independence with performance of ADLs and functional tasks;to maximize safety and independence with self cares  Target Date: 06/19/24      Frequency of Treatment: 1 x per week  Duration of Treatment: 6 weeks    Recommended Referrals to Other Professionals:   Education Assessment:        Risks and benefits of evaluation/treatment have been explained.   Patient/Family/caregiver agrees with Plan of Care.     Evaluation Time:     PT Eval, Moderate Complexity Minutes (02517): 15       Signing Clinician: Ruth Lance, PT

## 2024-05-22 ENCOUNTER — THERAPY VISIT (OUTPATIENT)
Dept: PHYSICAL THERAPY | Facility: CLINIC | Age: 59
End: 2024-05-22
Payer: COMMERCIAL

## 2024-05-22 DIAGNOSIS — M25.511 RIGHT ANTERIOR SHOULDER PAIN: Primary | ICD-10-CM

## 2024-05-22 PROCEDURE — 97110 THERAPEUTIC EXERCISES: CPT | Mod: GP | Performed by: PHYSICAL THERAPIST

## 2024-05-23 ENCOUNTER — PATIENT OUTREACH (OUTPATIENT)
Dept: CARE COORDINATION | Facility: CLINIC | Age: 59
End: 2024-05-23
Payer: COMMERCIAL

## 2024-05-29 ENCOUNTER — THERAPY VISIT (OUTPATIENT)
Dept: PHYSICAL THERAPY | Facility: CLINIC | Age: 59
End: 2024-05-29
Payer: COMMERCIAL

## 2024-05-29 DIAGNOSIS — M25.511 RIGHT ANTERIOR SHOULDER PAIN: Primary | ICD-10-CM

## 2024-05-29 PROCEDURE — 97110 THERAPEUTIC EXERCISES: CPT | Mod: GP | Performed by: PHYSICAL THERAPIST

## 2024-06-06 ENCOUNTER — PATIENT OUTREACH (OUTPATIENT)
Dept: CARE COORDINATION | Facility: CLINIC | Age: 59
End: 2024-06-06
Payer: COMMERCIAL

## 2024-06-12 ENCOUNTER — THERAPY VISIT (OUTPATIENT)
Dept: PHYSICAL THERAPY | Facility: CLINIC | Age: 59
End: 2024-06-12
Payer: COMMERCIAL

## 2024-06-12 DIAGNOSIS — M25.511 RIGHT ANTERIOR SHOULDER PAIN: Primary | ICD-10-CM

## 2024-06-12 PROCEDURE — 97140 MANUAL THERAPY 1/> REGIONS: CPT | Mod: GP | Performed by: PHYSICAL THERAPIST

## 2024-06-12 PROCEDURE — 97110 THERAPEUTIC EXERCISES: CPT | Mod: GP | Performed by: PHYSICAL THERAPIST

## 2024-06-26 ENCOUNTER — THERAPY VISIT (OUTPATIENT)
Dept: PHYSICAL THERAPY | Facility: CLINIC | Age: 59
End: 2024-06-26
Payer: COMMERCIAL

## 2024-06-26 DIAGNOSIS — M75.101 ROTATOR CUFF SYNDROME, RIGHT: Primary | ICD-10-CM

## 2024-06-26 PROCEDURE — 97110 THERAPEUTIC EXERCISES: CPT | Mod: GP | Performed by: PHYSICAL THERAPIST

## 2024-07-17 ENCOUNTER — THERAPY VISIT (OUTPATIENT)
Dept: PHYSICAL THERAPY | Facility: CLINIC | Age: 59
End: 2024-07-17
Payer: COMMERCIAL

## 2024-07-17 DIAGNOSIS — M75.101 ROTATOR CUFF SYNDROME, RIGHT: Primary | ICD-10-CM

## 2024-07-17 PROCEDURE — 97110 THERAPEUTIC EXERCISES: CPT | Mod: GP | Performed by: PHYSICAL THERAPIST

## 2024-07-17 PROCEDURE — 97140 MANUAL THERAPY 1/> REGIONS: CPT | Mod: GP | Performed by: PHYSICAL THERAPIST

## 2024-08-28 ENCOUNTER — ANCILLARY PROCEDURE (OUTPATIENT)
Dept: MAMMOGRAPHY | Facility: CLINIC | Age: 59
End: 2024-08-28
Payer: COMMERCIAL

## 2024-08-28 DIAGNOSIS — Z12.31 VISIT FOR SCREENING MAMMOGRAM: ICD-10-CM

## 2024-08-28 PROCEDURE — 77063 BREAST TOMOSYNTHESIS BI: CPT

## 2024-09-01 ENCOUNTER — HEALTH MAINTENANCE LETTER (OUTPATIENT)
Age: 59
End: 2024-09-01

## 2024-09-04 ENCOUNTER — ANCILLARY PROCEDURE (OUTPATIENT)
Dept: MAMMOGRAPHY | Facility: CLINIC | Age: 59
End: 2024-09-04
Attending: FAMILY MEDICINE
Payer: COMMERCIAL

## 2024-09-04 DIAGNOSIS — N64.89 BREAST ASYMMETRY: ICD-10-CM

## 2024-09-04 PROCEDURE — 77061 BREAST TOMOSYNTHESIS UNI: CPT | Mod: LT

## 2024-09-04 PROCEDURE — 76642 ULTRASOUND BREAST LIMITED: CPT | Mod: LT

## 2024-12-16 ENCOUNTER — PATIENT OUTREACH (OUTPATIENT)
Dept: CARE COORDINATION | Facility: CLINIC | Age: 59
End: 2024-12-16
Payer: COMMERCIAL

## 2025-01-08 ENCOUNTER — OFFICE VISIT (OUTPATIENT)
Dept: URGENT CARE | Facility: URGENT CARE | Age: 60
End: 2025-01-08
Payer: COMMERCIAL

## 2025-01-08 VITALS
HEART RATE: 65 BPM | TEMPERATURE: 98.3 F | OXYGEN SATURATION: 100 % | SYSTOLIC BLOOD PRESSURE: 151 MMHG | RESPIRATION RATE: 20 BRPM | DIASTOLIC BLOOD PRESSURE: 99 MMHG

## 2025-01-08 DIAGNOSIS — R53.83 OTHER FATIGUE: ICD-10-CM

## 2025-01-08 DIAGNOSIS — J01.10 ACUTE NON-RECURRENT FRONTAL SINUSITIS: Primary | ICD-10-CM

## 2025-01-08 LAB
BASOPHILS # BLD AUTO: 0 10E3/UL (ref 0–0.2)
BASOPHILS NFR BLD AUTO: 0 %
EOSINOPHIL # BLD AUTO: 0 10E3/UL (ref 0–0.7)
EOSINOPHIL NFR BLD AUTO: 0 %
ERYTHROCYTE [DISTWIDTH] IN BLOOD BY AUTOMATED COUNT: 13.9 % (ref 10–15)
HCT VFR BLD AUTO: 39.3 % (ref 35–47)
HGB BLD-MCNC: 12.8 G/DL (ref 11.7–15.7)
IMM GRANULOCYTES # BLD: 0 10E3/UL
IMM GRANULOCYTES NFR BLD: 0 %
LYMPHOCYTES # BLD AUTO: 2.4 10E3/UL (ref 0.8–5.3)
LYMPHOCYTES NFR BLD AUTO: 55 %
MCH RBC QN AUTO: 27.9 PG (ref 26.5–33)
MCHC RBC AUTO-ENTMCNC: 32.6 G/DL (ref 31.5–36.5)
MCV RBC AUTO: 86 FL (ref 78–100)
MONOCYTES # BLD AUTO: 0.4 10E3/UL (ref 0–1.3)
MONOCYTES NFR BLD AUTO: 8 %
NEUTROPHILS # BLD AUTO: 1.6 10E3/UL (ref 1.6–8.3)
NEUTROPHILS NFR BLD AUTO: 36 %
PLATELET # BLD AUTO: 154 10E3/UL (ref 150–450)
RBC # BLD AUTO: 4.58 10E6/UL (ref 3.8–5.2)
TSH SERPL DL<=0.005 MIU/L-ACNC: 0.55 UIU/ML (ref 0.3–4.2)
WBC # BLD AUTO: 4.5 10E3/UL (ref 4–11)

## 2025-01-08 PROCEDURE — 85025 COMPLETE CBC W/AUTO DIFF WBC: CPT | Performed by: PHYSICIAN ASSISTANT

## 2025-01-08 PROCEDURE — 84443 ASSAY THYROID STIM HORMONE: CPT | Performed by: PHYSICIAN ASSISTANT

## 2025-01-08 PROCEDURE — 36415 COLL VENOUS BLD VENIPUNCTURE: CPT | Performed by: PHYSICIAN ASSISTANT

## 2025-01-08 PROCEDURE — 99213 OFFICE O/P EST LOW 20 MIN: CPT | Performed by: PHYSICIAN ASSISTANT

## 2025-01-08 RX ORDER — BENZONATATE 200 MG/1
200 CAPSULE ORAL 3 TIMES DAILY PRN
Qty: 30 CAPSULE | Refills: 1 | Status: SHIPPED | OUTPATIENT
Start: 2025-01-08

## 2025-01-08 RX ORDER — DOXYCYCLINE 100 MG/1
100 TABLET ORAL 2 TIMES DAILY
Qty: 20 TABLET | Refills: 0 | Status: SHIPPED | OUTPATIENT
Start: 2025-01-08 | End: 2025-01-18

## 2025-01-08 NOTE — PROGRESS NOTES
Assessment & Plan     Acute non-recurrent frontal sinusitis  Doxycycline as ordered and tessalon for cough.     Push fluids, rest and ibuprofen or tylenol for comfort.    RTC for persistent or worsening sx.   At the end of the encounter, I discussed results, diagnosis, medications. Discussed red flags for immediate return to clinic/ER, as well as indications for follow up if no improvement. Patient understood and agreed to plan. Patient was stable for discharge      - doxycycline monohydrate (ADOXA) 100 MG tablet  Dispense: 20 tablet; Refill: 0  - benzonatate (TESSALON) 200 MG capsule  Dispense: 30 capsule; Refill: 1    Other fatigue  Pt requests checking for causes of fatigue, may be due to prolonged illness however will check TSH and cbc. Will contact pt with results.      - benzonatate (TESSALON) 200 MG capsule  Dispense: 30 capsule; Refill: 1  - CBC with platelets and differential  - TSH with free T4 reflex      Return for 2 weeks if not improved, sooner if worsening..    Leandra Escobar PA-C  [facility]     Tracy is a 59 year old female who presents to clinic today for the following health issues:  Chief Complaint   Patient presents with    Cough     Started before danielle, linger SX that are on and off. Cough feels tight in chest.        HPI:  pt is a 59 year old female, presents to urgent care with concern re: prolonged illness x 2 weeks with worsening uri sx.    Nasal discharge, HA, facial pressure.  Discharge: initially thin, now thick.  Was improving but now worsening again.   Tight cough.  No fevers.    Wheezy at times.  No chest pain or sob.    No vomiting or diarrhea.    Nausea and low appitite.    Family with similar sx at home.    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            Review of Systems  Constitutional, HEENT, cardiovascular, pulmonary, gi and gu systems are negative, except as  otherwise noted.      Objective    BP (!) 151/99 (BP Location: Right arm, Patient Position: Sitting, Cuff Size: Adult Regular)   Pulse 65   Temp 98.3  F (36.8  C) (Oral)   Resp 20   SpO2 100%   Physical Exam   Pt is in no acute distress and appears well  Ears patent B:  TM s intact, non-injected. All land marks easily visibile    Nasal mucosa is edematous, mucoid discharge.    Pharynx: non erythematous, tonsils non hypertrophied, No exudate   Neck supple: no adenopathy  Lungs: CTA  Heart: RRR, no murmur, no thrills or heaves   Ext: no edema  Skin: no rashes    TTP maxillary sinus and frontal sinuses on the R.

## 2025-01-08 NOTE — LETTER
January 8, 2025      Tracy Kern  [address] [facility] [address]        To Whom It May Concern:    Tracy Kern  was seen today due to illness thus unable to attend work today and tomorrow 1-9-25.  Thank you.        Sincerely,        Leandra Escobar PA-C    Electronically signed

## 2025-01-13 ENCOUNTER — TELEPHONE (OUTPATIENT)
Dept: FAMILY MEDICINE | Facility: CLINIC | Age: 60
End: 2025-01-13
Payer: COMMERCIAL

## 2025-01-13 NOTE — TELEPHONE ENCOUNTER
Patient is requesting a work note for missing work. Her work told her to contact you to see if you can extend the letter for missing Friday through Sunday. She had appointment that scheduling cancelled, appointment tomorrow is no longer available. She states symptoms are a lot better after UC visit 1/8/25.    She is also questioning if you can add when she can return to work to note. She is requesting note be completed by 11am tomorrow so they can add it to payroll. Please advise if this is an option without OV.    Nanci Wilson, RN on 1/13/2025 at 4:42 PM

## 2025-01-14 NOTE — TELEPHONE ENCOUNTER
I can write a letter that she was seen in the urgent care and treated. I cannot account for her being out of work all weekend.   AMENA Riojas MD

## 2025-01-16 NOTE — TELEPHONE ENCOUNTER
"Patient came in clinic yesterday requesting updated work note extending dates but provider Leandra Escobar still declined to excuse patient of extending missed work. Patient also states sx have improved. Writer informed patient since sx have improved, patient is okay to RTW unless sx have worsen then patient will need to follow up with pcp and request for a work note if needed. Patient understood and \"will just not get paid for being sick\". No further questions.    James Orozco MA on 01/16/2025 at 4:39 PM  "

## 2025-01-31 ENCOUNTER — TRANSFERRED RECORDS (OUTPATIENT)
Dept: HEALTH INFORMATION MANAGEMENT | Facility: CLINIC | Age: 60
End: 2025-01-31
Payer: COMMERCIAL

## 2025-02-12 ENCOUNTER — OFFICE VISIT (OUTPATIENT)
Dept: FAMILY MEDICINE | Facility: CLINIC | Age: 60
End: 2025-02-12
Payer: COMMERCIAL

## 2025-02-12 VITALS
HEART RATE: 59 BPM | BODY MASS INDEX: 44.63 KG/M2 | SYSTOLIC BLOOD PRESSURE: 138 MMHG | TEMPERATURE: 97.5 F | DIASTOLIC BLOOD PRESSURE: 84 MMHG | HEIGHT: 63 IN | OXYGEN SATURATION: 98 % | RESPIRATION RATE: 15 BRPM | WEIGHT: 251.9 LBS

## 2025-02-12 DIAGNOSIS — Z01.818 PREOP GENERAL PHYSICAL EXAM: Primary | ICD-10-CM

## 2025-02-12 DIAGNOSIS — H35.341 MACULAR HOLE OF RIGHT EYE: ICD-10-CM

## 2025-02-12 DIAGNOSIS — E66.01 MORBID OBESITY (H): ICD-10-CM

## 2025-02-12 PROCEDURE — 99214 OFFICE O/P EST MOD 30 MIN: CPT | Performed by: PHYSICIAN ASSISTANT

## 2025-02-12 RX ORDER — PREDNISOLONE ACETATE 10 MG/ML
SUSPENSION/ DROPS OPHTHALMIC
COMMUNITY
Start: 2025-02-05

## 2025-02-12 NOTE — PROGRESS NOTES
Preoperative Evaluation  [facility]  [address]  [facility] [address]  Phone: [phone]  Primary Provider: Denae Riojas MD  Pre-op Performing Provider: Jessica Chairez PA-C  Feb 12, 2025 2/12/2025   Surgical Information   What procedure is being done? eye surgery -    Facility or Hospital where procedure/surgery will be performed: [facility]   Who is doing the procedure / surgery? Dr. Dean Wisdom - [facility]   Date of surgery / procedure: 02/24/25   Time of surgery / procedure: 11 AM   Where do you plan to recover after surgery? at home with family     Fax number for surgical facility: [phone]    Needing forms filled out for time off work after surgery - we faxed at her request to surgeon's office, I'm not sure her restrictions following surgery. She will  this afternoon to discuss.    Assessment & Plan     The proposed surgical procedure is considered LOW risk.    Preop general physical exam  Approval given.    Macular hole of right eye  Causing vision changes, needs procedure.    Morbid obesity (H)  Stable, continue to monitor. Due for preventive exam.           Risks and Recommendations  The patient has the following additional risks and recommendations for perioperative complications:   - Morbid obesity (BMI >40)    Antiplatelet or Anticoagulation Medication Instructions   - We reviewed the medication list and the patient is not on an antiplatelet or anticoagulation medications.    Additional Medication Instructions  We reviewed the medication list and there are no chronic medications that need to be adjusted for this procedure.    Recommendation  Approval given to proceed with proposed procedure, without further diagnostic evaluation.    Rosana Molina is a 60 year old, presenting for the following:  Pre-Op Exam    HPI related to upcoming procedure: patient has right eye vision changes  and found to have macular hole. Needs procedure.        2/12/2025   Pre-Op Questionnaire   Have you ever had a heart attack or stroke? No   Have you ever had surgery on your heart or blood vessels, such as a stent placement, a coronary artery bypass, or surgery on an artery in your head, neck, heart, or legs? No   Do you have chest pain with activity? No   Do you have a history of heart failure? No   Do you currently have a cold, bronchitis or symptoms of other infection? No   Do you have a cough, shortness of breath, or wheezing? No   Do you or anyone in your family have previous history of blood clots? No   Do you or does anyone in your family have a serious bleeding problem such as prolonged bleeding following surgeries or cuts? No   Have you ever had problems with anemia or been told to take iron pills? No   Have you had any abnormal blood loss such as black, tarry or bloody stools, or abnormal vaginal bleeding? No   Have you ever had a blood transfusion? No   Are you willing to have a blood transfusion if it is medically needed before, during, or after your surgery? Yes   Have you or any of your relatives ever had problems with anesthesia? No   Do you have sleep apnea, excessive snoring or daytime drowsiness? No   Do you have any artifical heart valves or other implanted medical devices like a pacemaker, defibrillator, or continuous glucose monitor? No   Do you have artificial joints? No   Are you allergic to latex? No     Health Care Directive  Patient does not have a Health Care Directive: Discussed advance care planning with patient; however, patient declined at this time.    Preoperative Review of    reviewed - no record of controlled substances prescribed.      Status of Chronic Conditions:  See problem list for active medical problems.  Problems all longstanding and stable, except as noted/documented.  See ROS for pertinent symptoms related to these conditions.    Patient Active Problem List     Diagnosis Date Noted    Rotator cuff syndrome, right 06/26/2024     Priority: Medium    Right anterior shoulder pain 05/22/2024     Priority: Medium    Other malaise and fatigue 10/28/2020     Priority: Medium     Overview:   Created by Conversion      Nonspecific reaction to tuberculin skin test without active tuberculosis 10/28/2020     Priority: Medium     Overview:   Created by Conversion      Morbid obesity (H) 03/19/2019     Priority: Medium      History reviewed. No pertinent past medical history.  Past Surgical History:   Procedure Laterality Date    NO HISTORY OF SURGERY       Current Outpatient Medications   Medication Sig Dispense Refill    prednisoLONE acetate (PRED FORTE) 1 % ophthalmic suspension       Acetaminophen (TYLENOL ARTHRITIS PAIN PO)  (Patient not taking: Reported on 2/12/2025)      IBUPROFEN PO  (Patient not taking: Reported on 2/12/2025)         Allergies   Allergen Reactions    Chloroquine Itching    Levofloxacin Itching        Social History     Tobacco Use    Smoking status: Never    Smokeless tobacco: Never   Substance Use Topics    Alcohol use: No     Family History   Problem Relation Age of Onset    Other - See Comments Mother         Thyroid     No Known Problems Mother     No Known Problems Father     No Known Problems Sister     No Known Problems Daughter     No Known Problems Maternal Grandmother     No Known Problems Maternal Grandfather     No Known Problems Paternal Grandmother     No Known Problems Paternal Grandfather     No Known Problems Maternal Aunt     No Known Problems Paternal Aunt     Hereditary Breast and Ovarian Cancer Syndrome No family hx of     Breast Cancer No family hx of     Cancer No family hx of     Colon Cancer No family hx of     Endometrial Cancer No family hx of     Ovarian Cancer No family hx of      History   Drug Use No             Review of Systems  CONSTITUTIONAL: NEGATIVE for fever, chills, change in weight  INTEGUMENTARY/SKIN: NEGATIVE for  "worrisome rashes, moles or lesions  EYES: POSITIVE for right eye vision changes  ENT/MOUTH: NEGATIVE for ear, mouth and throat problems  RESP: NEGATIVE for significant cough or SOB  BREAST: NEGATIVE for masses, tenderness or discharge  CV: NEGATIVE for chest pain, palpitations or peripheral edema  GI: NEGATIVE for nausea, abdominal pain, heartburn, or change in bowel habits  : NEGATIVE for frequency, dysuria, or hematuria  MUSCULOSKELETAL: NEGATIVE for significant arthralgias or myalgia  NEURO: NEGATIVE for weakness, dizziness or paresthesias  ENDOCRINE: NEGATIVE for temperature intolerance, skin/hair changes  HEME: NEGATIVE for bleeding problems  PSYCHIATRIC: NEGATIVE for changes in mood or affect    Objective    /84   Pulse 59   Temp 97.5  F (36.4  C) (Tympanic)   Resp 15   Ht 1.6 m (5' 2.99\")   Wt 114.3 kg (251 lb 14.4 oz)   SpO2 98%   BMI 44.63 kg/m     Estimated body mass index is 44.63 kg/m  as calculated from the following:    Height as of this encounter: 1.6 m (5' 2.99\").    Weight as of this encounter: 114.3 kg (251 lb 14.4 oz).  Physical Exam  GENERAL: alert and no distress  EYES: Eyes grossly normal to inspection, PERRL and conjunctivae and sclerae normal  HENT: ear canals and TM's normal, nose and mouth without ulcers or lesions  NECK: no adenopathy, no asymmetry, masses, or scars  RESP: lungs clear to auscultation - no rales, rhonchi or wheezes  CV: regular rate and rhythm, normal S1 S2, no S3 or S4, no murmur, click or rub, no peripheral edema  ABDOMEN: soft, nontender, no hepatosplenomegaly, no masses and bowel sounds normal  MS: no gross musculoskeletal defects noted, no edema  SKIN: no suspicious lesions or rashes  NEURO: Normal strength and tone, mentation intact and speech normal  PSYCH: mentation appears normal, affect normal/bright    Recent Labs   Lab Test 01/08/25  1044   HGB 12.8           Diagnostics  No labs were ordered during this visit.   No EKG required for low " risk surgery (cataract, skin procedure, breast biopsy, etc).  No EKG required, no history of coronary heart disease, significant arrhythmia, peripheral arterial disease or other structural heart disease.    Revised Cardiac Risk Index (RCRI)  The patient has the following serious cardiovascular risks for perioperative complications:   - No serious cardiac risks = 0 points     RCRI Interpretation: 0 points: Class I (very low risk - 0.4% complication rate)         Signed Electronically by: Jessica Chairez PA-C  A copy of this evaluation report is provided to the requesting physician.

## 2025-02-12 NOTE — PATIENT INSTRUCTIONS
How to Take Your Medication Before Surgery  Preoperative Medication Instructions   Antiplatelet or Anticoagulation Medication Instructions   - We reviewed the medication list and the patient is not on an antiplatelet or anticoagulation medications.    Additional Medication Instructions  We reviewed the medication list and there are no chronic medications that need to be adjusted for this procedure.       Patient Education   Preparing for Your Surgery  For Adults  Getting started  In most cases, a nurse will call to review your health history and instructions. They will give you an arrival time based on your scheduled surgery time. Please be ready to share:  Your doctor's clinic name and phone number  Your medical, surgical, and anesthesia history  A list of allergies and sensitivities  A list of medicines, including herbal treatments and over-the-counter drugs  Whether the patient has a legal guardian (ask how to send us the papers in advance)  Note: You may not receive a call if you were seen at our PAC (Preoperative Assessment Center).  Please tell us if you're pregnant--or if there's any chance you might be pregnant. Some surgeries may injure a fetus (unborn baby), so they require a pregnancy test. Surgeries that are safe for a fetus don't always need a test, and you can choose whether to have one.   Preparing for surgery  Within 10 to 30 days of surgery: Have a pre-op exam (sometimes called an H&P, or History and Physical). This can be done at a clinic or pre-operative center.  If you're having a , you may not need this exam. Talk to your care team.  At your pre-op exam, talk to your care team about all medicines you take. (This includes CBD oil and any drugs, such as THC, marijuana, and other forms of cannabis.) If you need to stop any medicine before surgery, ask when to start taking it again.  This is for your safety. Many medicines and drugs can make you bleed too much during surgery. Some change  how well surgery (anesthesia) drugs work.  Call your insurance company to let them know you're having surgery. (If you don't have insurance, call [phone].)  Call your clinic if there's any change in your health. This includes a scrape or scratch near the surgery site, or any signs of a cold (sore throat, runny nose, cough, rash, fever).  Eating and drinking guidelines  For your safety: Unless your surgeon tells you otherwise, follow the guidelines below.  Eat and drink as normal until 8 hours before you arrive for surgery. After that, no food or milk. You can spit out gum when you arrive.  Drink clear liquids until 2 hours before you arrive. These are liquids you can see through, like water, Gatorade, and Propel Water. They also include plain black coffee and tea (no cream or milk).  No alcohol for 24 hours before you arrive. The night before surgery, stop any drinks that contain THC.  If your care team tells you to take medicine on the morning of surgery, it's okay to take it with a sip of water. No other medicines or drugs are allowed (including CBD oil)--follow your care team's instructions.  If you have questions the day of surgery, call your hospital or surgery center.   Preventing infection  Shower or bathe the night before and the morning of surgery. Follow the instructions your clinic gave you. (If no instructions, use regular soap.)  Don't shave or clip hair near your surgery site. We'll remove the hair if needed.  Don't smoke or vape the morning of surgery. No chewing tobacco for 6 hours before you arrive. A nicotine patch is okay. You may spit out nicotine gum when you arrive.  For some surgeries, the surgeon will tell you to fully quit smoking and nicotine.  We will make every effort to keep you safe from infection. We will:  Clean our hands often with soap and water (or an alcohol-based hand rub).  Clean the skin at your surgery site with a special soap that kills germs.  Give you a special gown to  keep you warm. (Cold raises the risk of infection.)  Wear hair covers, masks, gowns, and gloves during surgery.  Give antibiotic medicine, if prescribed. Not all surgeries need this medicine.  What to bring on the day of surgery  Photo ID and insurance card  Copy of your health care directive, if you have one  Glasses and hearing aids (bring cases)  You can't wear contacts during surgery  Inhaler and eye drops, if you use them (tell us about these when you arrive)  CPAP machine or breathing device, if you use them  A few personal items, if spending the night  If you have . . .  A pacemaker, ICD (cardiac defibrillator), or other implant: Bring the ID card.  An implanted stimulator: Bring the remote control.  A legal guardian: Bring a copy of the certified (court-stamped) guardianship papers.  Please remove any jewelry, including body piercings. Leave jewelry and other valuables at home.  If you're going home the day of surgery  You must have a responsible adult drive you home. They should stay with you overnight as well.  If you don't have someone to stay with you, and you aren't safe to go home alone, we may keep you overnight. Insurance often won't pay for this.  After surgery  If it's hard to control your pain or you need more pain medicine, please call your surgeon's office.  Questions?   If you have any questions for your care team, list them here:   ____________________________________________________________________________________________________________________________________________________________________________________________________________________________________________________________  For informational purposes only. Not to replace the advice of your health care provider. Copyright   2003, 2019 [facility]. All rights reserved. Clinically reviewed by Luther Fernández MD. [employer] 258242 - REV 08/24.

## 2025-04-11 ENCOUNTER — TRANSFERRED RECORDS (OUTPATIENT)
Dept: HEALTH INFORMATION MANAGEMENT | Facility: CLINIC | Age: 60
End: 2025-04-11

## 2025-05-10 ENCOUNTER — OFFICE VISIT (OUTPATIENT)
Dept: URGENT CARE | Facility: URGENT CARE | Age: 60
End: 2025-05-10
Payer: COMMERCIAL

## 2025-05-10 VITALS
SYSTOLIC BLOOD PRESSURE: 149 MMHG | DIASTOLIC BLOOD PRESSURE: 96 MMHG | WEIGHT: 257 LBS | OXYGEN SATURATION: 98 % | TEMPERATURE: 99 F | RESPIRATION RATE: 20 BRPM | HEART RATE: 89 BPM | BODY MASS INDEX: 45.54 KG/M2

## 2025-05-10 DIAGNOSIS — H65.02 NON-RECURRENT ACUTE SEROUS OTITIS MEDIA OF LEFT EAR: Primary | ICD-10-CM

## 2025-05-10 PROCEDURE — 1125F AMNT PAIN NOTED PAIN PRSNT: CPT | Performed by: NURSE PRACTITIONER

## 2025-05-10 PROCEDURE — 99213 OFFICE O/P EST LOW 20 MIN: CPT | Performed by: NURSE PRACTITIONER

## 2025-05-10 PROCEDURE — 3077F SYST BP >= 140 MM HG: CPT | Performed by: NURSE PRACTITIONER

## 2025-05-10 PROCEDURE — 3080F DIAST BP >= 90 MM HG: CPT | Performed by: NURSE PRACTITIONER

## 2025-05-10 RX ORDER — AMOXICILLIN 875 MG/1
875 TABLET, COATED ORAL 2 TIMES DAILY
Qty: 20 TABLET | Refills: 0 | Status: SHIPPED | OUTPATIENT
Start: 2025-05-10 | End: 2025-05-20

## 2025-05-10 ASSESSMENT — PAIN SCALES - GENERAL: PAINLEVEL_OUTOF10: SEVERE PAIN (7)

## 2025-05-10 NOTE — PROGRESS NOTES
{PROVIDER CHARTING PREFERENCE:470095}    Subjective   Tracy is a 60 year old, presenting for the following health issues:  Pharyngitis and Left ear pain (Body aches has yaa going on for 3 days)      5/10/2025     4:23 PM   Additional Questions   Roomed by Sharda   Accompanied by self         5/10/2025     4:23 PM   Patient Reported Additional Medications   Patient reports taking the following new medications none     HPI      {MA/LPN/RN Pre-Provider Visit Orders- hCG/UA/Strep (Optional):472534}  Sore throat left ear pain and body aches for 3 days  {additonal problems for provider to add (Optional):305335}    {ROS Picklists (Optional):559139}      Objective    There were no vitals taken for this visit.  There is no height or weight on file to calculate BMI.  Physical Exam   {Exam List (Optional):439508}    {Diagnostic Test Results (Optional):952509}        Signed Electronically by: YOCASTA LYNN PROVIDER  {Email feedback regarding this note to [email or web address]   :145171}

## 2025-05-10 NOTE — PROGRESS NOTES
SUBJECTIVE:  Tracy Kern is a 60 year old female who presents with left ear pain for 7 day(s).   Severity: moderate   Additional symptoms include congestion, cough, ear pain, and fever.      Pharyngitis and Left ear pain          No past medical history on file.    Social History     Tobacco Use    Smoking status: Never     Passive exposure: Past    Smokeless tobacco: Never   Vaping Use    Vaping status: Never Used   Substance Use Topics    Alcohol use: No    Drug use: No       REVIEW OF SYSTEMS  ROS:   Review of systems negative except as stated above.        OBJECTIVE:  BP (!) 149/96 (BP Location: Right arm, Patient Position: Sitting, Cuff Size: Adult Large)   Pulse 89   Temp 99  F (37.2  C) (Oral)   Resp 20   Wt 116.6 kg (257 lb)   SpO2 98%   BMI 45.54 kg/m     The right TM is normal: no effusions, no erythema, and normal landmarks     The right auditory canal is normal and without drainage, edema or erythema  The left TM is erythematous  The left auditory canal is normal and without drainage, edema or erythema  Oropharynx exam is normal: no lesions, erythema, adenopathy or exudate.  GENERAL: no acute distress  EYES: EOMI,  PERRL, conjunctiva clear  NECK: supple, non-tender to palpation, no adenopathy noted  RESP: lungs clear to auscultation - no rales, rhonchi or wheezes  SKIN: no suspicious lesions or rashes   CV: regular rates and rhythm, normal    ASSESSMENT:    ICD-10-CM    1. Non-recurrent acute serous otitis media of left ear  H65.02 amoxicillin (AMOXIL) 875 MG tablet            PLAN:  Amoxicillin 875mg two times per day for 10 days.    May use acetaminophen, ibuprofen prn.  Return for recheck in 2-4 weeks, sooner if symptoms worsen.      Makayla Cali, APRN CNP

## 2025-05-23 ENCOUNTER — TRANSFERRED RECORDS (OUTPATIENT)
Dept: HEALTH INFORMATION MANAGEMENT | Facility: CLINIC | Age: 60
End: 2025-05-23

## 2025-05-23 ENCOUNTER — OFFICE VISIT (OUTPATIENT)
Dept: FAMILY MEDICINE | Facility: CLINIC | Age: 60
End: 2025-05-23
Payer: COMMERCIAL

## 2025-05-23 VITALS
SYSTOLIC BLOOD PRESSURE: 144 MMHG | DIASTOLIC BLOOD PRESSURE: 94 MMHG | TEMPERATURE: 97.4 F | BODY MASS INDEX: 47.48 KG/M2 | WEIGHT: 258 LBS | OXYGEN SATURATION: 97 % | RESPIRATION RATE: 14 BRPM | HEART RATE: 70 BPM | HEIGHT: 62 IN

## 2025-05-23 DIAGNOSIS — R11.0 NAUSEA: ICD-10-CM

## 2025-05-23 DIAGNOSIS — H90.3 BILATERAL SENSORINEURAL HEARING LOSS: ICD-10-CM

## 2025-05-23 DIAGNOSIS — Z12.11 SPECIAL SCREENING FOR MALIGNANT NEOPLASMS, COLON: ICD-10-CM

## 2025-05-23 DIAGNOSIS — Z12.31 VISIT FOR SCREENING MAMMOGRAM: ICD-10-CM

## 2025-05-23 DIAGNOSIS — H69.92 DYSFUNCTION OF LEFT EUSTACHIAN TUBE: ICD-10-CM

## 2025-05-23 DIAGNOSIS — E66.813 CLASS 3 SEVERE OBESITY DUE TO EXCESS CALORIES WITHOUT SERIOUS COMORBIDITY WITH BODY MASS INDEX (BMI) OF 45.0 TO 49.9 IN ADULT (H): ICD-10-CM

## 2025-05-23 DIAGNOSIS — H92.02 LEFT EAR PAIN: ICD-10-CM

## 2025-05-23 DIAGNOSIS — Z00.01 ENCOUNTER FOR ROUTINE ADULT MEDICAL EXAM WITH ABNORMAL FINDINGS: Primary | ICD-10-CM

## 2025-05-23 DIAGNOSIS — R06.83 SNORING: ICD-10-CM

## 2025-05-23 PROCEDURE — 90471 IMMUNIZATION ADMIN: CPT | Performed by: FAMILY MEDICINE

## 2025-05-23 PROCEDURE — 99396 PREV VISIT EST AGE 40-64: CPT | Mod: 25 | Performed by: FAMILY MEDICINE

## 2025-05-23 PROCEDURE — 3080F DIAST BP >= 90 MM HG: CPT | Performed by: FAMILY MEDICINE

## 2025-05-23 PROCEDURE — 3077F SYST BP >= 140 MM HG: CPT | Performed by: FAMILY MEDICINE

## 2025-05-23 PROCEDURE — 90472 IMMUNIZATION ADMIN EACH ADD: CPT | Performed by: FAMILY MEDICINE

## 2025-05-23 PROCEDURE — 99214 OFFICE O/P EST MOD 30 MIN: CPT | Mod: 25 | Performed by: FAMILY MEDICINE

## 2025-05-23 PROCEDURE — 90750 HZV VACC RECOMBINANT IM: CPT | Performed by: FAMILY MEDICINE

## 2025-05-23 PROCEDURE — 90677 PCV20 VACCINE IM: CPT | Performed by: FAMILY MEDICINE

## 2025-05-23 RX ORDER — PREDNISONE 20 MG/1
TABLET ORAL
Qty: 40 TABLET | Refills: 0 | Status: SHIPPED | OUTPATIENT
Start: 2025-05-23

## 2025-05-23 RX ORDER — OMEPRAZOLE 20 MG/1
20 CAPSULE, DELAYED RELEASE ORAL DAILY
Qty: 90 CAPSULE | Refills: 0 | Status: SHIPPED | OUTPATIENT
Start: 2025-05-23

## 2025-05-23 SDOH — HEALTH STABILITY: PHYSICAL HEALTH: ON AVERAGE, HOW MANY DAYS PER WEEK DO YOU ENGAGE IN MODERATE TO STRENUOUS EXERCISE (LIKE A BRISK WALK)?: 2 DAYS

## 2025-05-23 SDOH — HEALTH STABILITY: PHYSICAL HEALTH: ON AVERAGE, HOW MANY MINUTES DO YOU ENGAGE IN EXERCISE AT THIS LEVEL?: 30 MIN

## 2025-05-23 ASSESSMENT — SOCIAL DETERMINANTS OF HEALTH (SDOH): HOW OFTEN DO YOU GET TOGETHER WITH FRIENDS OR RELATIVES?: PATIENT DECLINED

## 2025-05-23 NOTE — PROGRESS NOTES
Preventive Care Visit  [facility] MEDARDO Riojas MD, Family Medicine  May 23, 2025      Assessment & Plan     (Z00.01) Encounter for routine adult medical exam with abnormal findings  (primary encounter diagnosis)  Comment: We discussed self breast exams, exercise 30mins/day, and calcium with vitamin D at 1200mg/day, preferably from dietary sources.  Diet, Weight loss, and Exercise were discussed as well .  Reviewed vaccines and screenings.     (R06.83) Snoring  Comment: discussed with her - referral made   Plan: Adult Sleep Eval & Management          Referral            (H92.02) Left ear pain  Comment: initially reports ear infection, now reporting extreme loss of hearing   Plan: CANCELED: Adult ENT  Referral            (R11.0) Nausea  Comment: discussed with her. Unclear etiology - most likely gastritis/gastric ulcer or gall bladder dz. Will begin work up today but will need to follow-up if we don't discover etiology in order to determine next steps. Starting ppi today. Labs and ultrasound ordered. The patient indicates understanding of these issues and agrees with the plan.   Plan: Comprehensive metabolic panel (BMP + Alb, Alk         Phos, ALT, AST, Total. Bili, TP), Lipase, CBC         with platelets, US Abdomen Complete, omeprazole        (PRILOSEC) 20 MG DR capsule            (E66.813,  Z68.42) Class 3 severe obesity due to excess calories without serious comorbidity with body mass index (BMI) of 45.0 to 49.9 in adult (H)  Comment: working on staying active   Plan:     (H69.92) Dysfunction of left eustachian tube  Comment: discussed daily flonase   Plan: CANCELED: Adult ENT  Referral            (Z12.11) Special screening for malignant neoplasms, colon  Comment: discussed   Plan: Colonoscopy Screening  Referral            (Z12.31) Visit for screening mammogram  Comment: discussed   Plan: MA Screen Bilateral w/Otmas            (H90.3) Bilateral  "sensorineural hearing loss  Comment: she reports severe hearing loss so we tested in office and hearing was very poor. Out of an abundance of caution, I am starting her on steroids for SSNHL and referring her acutely to ENT. The patient indicates understanding of these issues and agrees with the plan.   Plan: predniSONE (DELTASONE) 20 MG tablet, Adult ENT          Referral                Patient has been advised of split billing requirements and indicates understanding: Yes        BMI  Estimated body mass index is 46.73 kg/m  as calculated from the following:    Height as of this encounter: 1.582 m (5' 2.3\").    Weight as of this encounter: 117 kg (258 lb).   Weight management plan: Discussed healthy diet and exercise guidelines    Counseling  Appropriate preventive services were addressed with this patient via screening, questionnaire, or discussion as appropriate for fall prevention, nutrition, physical activity, Tobacco-use cessation, social engagement, weight loss and cognition.  Checklist reviewing preventive services available has been given to the patient.  Reviewed patient's diet, addressing concerns and/or questions.   She is at risk for lack of exercise and has been provided with information to increase physical activity for the benefit of her well-being.   She is at risk for psychosocial distress and has been provided with information to reduce risk.         Subjective   Tracy is a 60 year old, presenting for the following:      Physical    Very sad today   Left ear hearing loss is very bothersome  Lost a good friend (young) to ovarian cancer  Just found out that she could have a hole in the left macula         5/23/2025     1:50 PM   Additional Questions   Roomed by YESSY Joseph   Accompanied by self         HPI    ED/ Followup:  Facility:  [facility]  Date of visit: 5/10/25  Reason for visit: left ear pain   Current Status: antibiotic completed, still has " whooshing noise - constantly and new with this infection .   She wants to see ENT     Also reports some excessive snoring.  Wants to do a sleep study.     Nausea after eating for months.   Now she is feeling often and worse at night   No history of abd surgery   No diarrhea/constipation   Rare use of ibuprofen        5/23/2025     4:02 PM   Hearing Screen Results   Right Ear- 1000Hz/40dB REFER   Right Ear - 500Hz/25dB REFER   Right Ear - 1000Hz/20dB REFER   Right Ear - 2000Hz/20dB REFER   Right Ear - 4000Hz/20dB REFER   Left Ear - 500Hz/25dB REFER   Left Ear - 1000Hz/20dB REFER   Left Ear - 2000Hz/20dB REFER   Left Ear - 4000Hz/20dB REFER   Hearing Screen Results RESCREEN   Hearing Screen Results- Second Attempt REFER         Advance Care Planning    Discussed advance care planning with patient; informed AVS has link to [facility] [employer].        5/23/2025   General Health   How would you rate your overall physical health? (!) FAIR   Feel stress (tense, anxious, or unable to sleep) To some extent   (!) STRESS CONCERN      5/23/2025   Nutrition   Three or more servings of calcium each day? (!) I DON'T KNOW   Diet: Regular (no restrictions)   How many servings of fruit and vegetables per day? (!) 2-3   How many sweetened beverages each day? 0-1         5/23/2025   Exercise   Days per week of moderate/strenous exercise 2 days   Average minutes spent exercising at this level 30 min   (!) EXERCISE CONCERN      5/23/2025   Social Factors   Frequency of gathering with friends or relatives Patient declined   Worry food won't last until get money to buy more No   Food not last or not have enough money for food? No   Do you have housing? (Housing is defined as stable permanent housing and does not include staying outside in a car, in a tent, in an abandoned building, in an overnight shelter, or couch-surfing.) Yes   Are you worried about losing your housing? No   Lack of transportation? No   Unable to get utilities  (heat,electricity)? No         5/23/2025   Fall Risk   Fallen 2 or more times in the past year? Yes   Trouble with walking or balance? No   Gait Speed Test Interpretation Less than or equal to 5.00 seconds - PASS           5/23/2025   Dental   Dentist two times every year? Yes           Today's PHQ-2 Score:       2/12/2025     2:23 PM   PHQ-2 ( 1999 [employer])   Q1: Little interest or pleasure in doing things 0   Q2: Feeling down, depressed or hopeless 0   PHQ-2 Score 0    Q1: Little interest or pleasure in doing things Not at all   Q2: Feeling down, depressed or hopeless Not at all   PHQ-2 Score 0       Patient-reported         5/23/2025   Substance Use   Alcohol more than 3/day or more than 7/wk No   Do you use any other substances recreationally? No     Social History     Tobacco Use    Smoking status: Never     Passive exposure: Past    Smokeless tobacco: Never   Vaping Use    Vaping status: Never Used   Substance Use Topics    Alcohol use: No    Drug use: No           8/28/2024   LAST FHS-7 RESULTS   1st degree relative breast or ovarian cancer No   Any relative bilateral breast cancer No   Any male have breast cancer No   Any ONE woman have BOTH breast AND ovarian cancer No   Any woman with breast cancer before 50yrs No   2 or more relatives with breast AND/OR ovarian cancer No   2 or more relatives with breast AND/OR bowel cancer No                5/23/2025   STI Screening   New sexual partner(s) since last STI/HIV test? No     History of abnormal Pap smear:         Latest Ref Rng & Units 6/19/2023     9:23 AM 6/27/2016     3:22 PM 6/27/2016    12:00 AM   PAP / HPV   PAP  Negative for Intraepithelial Lesion or Malignancy (NILM)  Negative for squamous intraepithelial lesion or malignancy  Electronically signed by Christiana Guillory CT (ASCP) on 7/1/2016 at  2:22 PM       HPV 16 DNA Negative Negative      HPV 18 DNA Negative Negative      Other HR HPV Negative Negative      PAP-ABSTRACT    See Scanned Document   "    ASCVD Risk   The 10-year ASCVD risk score (Nacho BRONSON, et al., 2019) is: 4.6%    Values used to calculate the score:      Age: 60 years      Sex: Female      Is Non- : Yes      Diabetic: No      Tobacco smoker: No      Systolic Blood Pressure: 144 mmHg      Is BP treated: No      HDL Cholesterol: 72 mg/dL      Total Cholesterol: 161 mg/dL           Reviewed and updated as needed this visit by Provider                          Review of Systems  Constitutional, neuro, ENT, endocrine, pulmonary, cardiac, gastrointestinal, genitourinary, musculoskeletal, integument and psychiatric systems are negative, except as otherwise noted.     Objective    Exam  BP (!) 144/94   Pulse 70   Temp 97.4  F (36.3  C) (Tympanic)   Resp 14   Ht 1.582 m (5' 2.3\")   Wt 117 kg (258 lb)   SpO2 97%   BMI 46.73 kg/m     Estimated body mass index is 46.73 kg/m  as calculated from the following:    Height as of this encounter: 1.582 m (5' 2.3\").    Weight as of this encounter: 117 kg (258 lb).    Physical Exam  GENERAL: alert and no distress  EYES: Eyes grossly normal to inspection, PERRL and conjunctivae and sclerae normal  HENT: normal cephalic/atraumatic, right ear: normal: no effusions, no erythema, normal landmarks, left ear: dull membrane, intact   oropharynx clear, and oral mucous membranes moist  NECK: no adenopathy, no asymmetry, masses, or scars  RESP: lungs clear to auscultation - no rales, rhonchi or wheezes  CV: regular rate and rhythm, normal S1 S2, no S3 or S4, no murmur, click or rub, no peripheral edema  ABDOMEN: soft, nontender, no hepatosplenomegaly, no masses and bowel sounds normal  MS: no gross musculoskeletal defects noted, no edema  SKIN: no suspicious lesions or rashes  NEURO: Normal strength and tone, mentation intact and speech normal  PSYCH: mentation appears normal, affect normal/bright        Signed Electronically by: Denae Riojas MD    "

## 2025-05-23 NOTE — PATIENT INSTRUCTIONS
Nausea  Start daily omeprazole  Labs and ultrasound ordered  Follow-up with me in 3 weeks to discuss further steps           Patient Education   Preventive Care Advice   This is general advice given by our system to help you stay healthy. However, your care team may have specific advice just for you. Please talk to your care team about your preventive care needs.  Nutrition  Eat 5 or more servings of fruits and vegetables each day.  Try wheat bread, brown rice and whole grain pasta (instead of white bread, rice, and pasta).  Get enough calcium and vitamin D. Check the label on foods and aim for 100% of the RDA (recommended daily allowance).  Lifestyle  Exercise at least 150 minutes each week  (30 minutes a day, 5 days a week).  Do muscle strengthening activities 2 days a week. These help control your weight and prevent disease.  No smoking.  Wear sunscreen to prevent skin cancer.  Have a dental exam and cleaning every 6 months.  Yearly exams  See your health care team every year to talk about:  Any changes in your health.  Any medicines your care team has prescribed.  Preventive care, family planning, and ways to prevent chronic diseases.  Shots (vaccines)   HPV shots (up to age 26), if you've never had them before.  Hepatitis B shots (up to age 59), if you've never had them before.  COVID-19 shot: Get this shot when it's due.  Flu shot: Get a flu shot every year.  Tetanus shot: Get a tetanus shot every 10 years.  Pneumococcal, hepatitis A, and RSV shots: Ask your care team if you need these based on your risk.  Shingles shot (for age 50 and up)  General health tests  Diabetes screening:  Starting at age 35, Get screened for diabetes at least every 3 years.  If you are younger than age 35, ask your care team if you should be screened for diabetes.  Cholesterol test: At age 39, start having a cholesterol test every 5 years, or more often if advised.  Bone density scan (DEXA): At age 50, ask your care team if you  should have this scan for osteoporosis (brittle bones).  Hepatitis C: Get tested at least once in your life.  STIs (sexually transmitted infections)  Before age 24: Ask your care team if you should be screened for STIs.  After age 24: Get screened for STIs if you're at risk. You are at risk for STIs (including HIV) if:  You are sexually active with more than one person.  You don't use condoms every time.  You or a partner was diagnosed with a sexually transmitted infection.  If you are at risk for HIV, ask about PrEP medicine to prevent HIV.  Get tested for HIV at least once in your life, whether you are at risk for HIV or not.  Cancer screening tests  Cervical cancer screening: If you have a cervix, begin getting regular cervical cancer screening tests starting at age 21.  Breast cancer scan (mammogram): If you've ever had breasts, begin having regular mammograms starting at age 40. This is a scan to check for breast cancer.  Colon cancer screening: It is important to start screening for colon cancer at age 45.  Have a colonoscopy test every 10 years (or more often if you're at risk) Or, ask your provider about stool tests like a FIT test every year or Cologuard test every 3 years.  To learn more about your testing options, visit:   .  For help making a decision, visit:   https://bit.ly/ei00723.  Prostate cancer screening test: If you have a prostate, ask your care team if a prostate cancer screening test (PSA) at age 55 is right for you.  Lung cancer screening: If you are a current or former smoker ages 50 to 80, ask your care team if ongoing lung cancer screenings are right for you.  For informational purposes only. Not to replace the advice of your health care provider. Copyright   2023 [employer] [employer]. All rights reserved. Clinically reviewed by the [facility]. [employer] 820852 - REV 01/24.

## 2025-05-26 ENCOUNTER — PATIENT OUTREACH (OUTPATIENT)
Dept: CARE COORDINATION | Facility: CLINIC | Age: 60
End: 2025-05-26
Payer: COMMERCIAL

## 2025-05-28 ENCOUNTER — OFFICE VISIT (OUTPATIENT)
Dept: AUDIOLOGY | Facility: CLINIC | Age: 60
End: 2025-05-28
Payer: COMMERCIAL

## 2025-05-28 ENCOUNTER — LAB (OUTPATIENT)
Dept: LAB | Facility: CLINIC | Age: 60
End: 2025-05-28
Payer: COMMERCIAL

## 2025-05-28 DIAGNOSIS — R11.0 NAUSEA: Primary | ICD-10-CM

## 2025-05-28 DIAGNOSIS — H90.A32 MIXED CONDUCTIVE AND SENSORINEURAL HEARING LOSS OF LEFT EAR WITH RESTRICTED HEARING OF RIGHT EAR: Primary | ICD-10-CM

## 2025-05-28 LAB
ALBUMIN SERPL BCG-MCNC: 4 G/DL (ref 3.5–5.2)
ALP SERPL-CCNC: 90 U/L (ref 40–150)
ALT SERPL W P-5'-P-CCNC: 16 U/L (ref 0–50)
ANION GAP SERPL CALCULATED.3IONS-SCNC: 10 MMOL/L (ref 7–15)
AST SERPL W P-5'-P-CCNC: 20 U/L (ref 0–45)
BILIRUB SERPL-MCNC: 0.3 MG/DL
BUN SERPL-MCNC: 18.1 MG/DL (ref 8–23)
CALCIUM SERPL-MCNC: 9.4 MG/DL (ref 8.8–10.4)
CHLORIDE SERPL-SCNC: 105 MMOL/L (ref 98–107)
CREAT SERPL-MCNC: 0.89 MG/DL (ref 0.51–0.95)
EGFRCR SERPLBLD CKD-EPI 2021: 74 ML/MIN/1.73M2
ERYTHROCYTE [DISTWIDTH] IN BLOOD BY AUTOMATED COUNT: 14 % (ref 10–15)
GLUCOSE SERPL-MCNC: 84 MG/DL (ref 70–99)
HCO3 SERPL-SCNC: 26 MMOL/L (ref 22–29)
HCT VFR BLD AUTO: 40.1 % (ref 35–47)
HGB BLD-MCNC: 13 G/DL (ref 11.7–15.7)
LIPASE SERPL-CCNC: 54 U/L (ref 13–60)
MCH RBC QN AUTO: 27.8 PG (ref 26.5–33)
MCHC RBC AUTO-ENTMCNC: 32.4 G/DL (ref 31.5–36.5)
MCV RBC AUTO: 86 FL (ref 78–100)
PLATELET # BLD AUTO: 190 10E3/UL (ref 150–450)
POTASSIUM SERPL-SCNC: 4 MMOL/L (ref 3.4–5.3)
PROT SERPL-MCNC: 7.5 G/DL (ref 6.4–8.3)
RBC # BLD AUTO: 4.67 10E6/UL (ref 3.8–5.2)
SODIUM SERPL-SCNC: 141 MMOL/L (ref 135–145)
WBC # BLD AUTO: 8.4 10E3/UL (ref 4–11)

## 2025-05-28 PROCEDURE — 92567 TYMPANOMETRY: CPT | Performed by: AUDIOLOGIST

## 2025-05-28 PROCEDURE — 85027 COMPLETE CBC AUTOMATED: CPT | Performed by: FAMILY MEDICINE

## 2025-05-28 PROCEDURE — 92557 COMPREHENSIVE HEARING TEST: CPT | Performed by: AUDIOLOGIST

## 2025-05-28 PROCEDURE — 83690 ASSAY OF LIPASE: CPT | Performed by: FAMILY MEDICINE

## 2025-05-28 PROCEDURE — 80053 COMPREHEN METABOLIC PANEL: CPT | Performed by: FAMILY MEDICINE

## 2025-05-28 PROCEDURE — 36415 COLL VENOUS BLD VENIPUNCTURE: CPT | Performed by: FAMILY MEDICINE

## 2025-05-28 NOTE — PROGRESS NOTES
AUDIOLOGY REPORT    SUMMARY: Audiology visit completed. See audiogram for results.      RECOMMENDATIONS: Follow-up with ENT.    John Robledo, CCC-A  [place] Licensed Audiologist #[id number]

## 2025-05-29 ENCOUNTER — RESULTS FOLLOW-UP (OUTPATIENT)
Dept: FAMILY MEDICINE | Facility: CLINIC | Age: 60
End: 2025-05-29

## 2025-05-30 ENCOUNTER — HOSPITAL ENCOUNTER (OUTPATIENT)
Dept: ULTRASOUND IMAGING | Facility: HOSPITAL | Age: 60
Discharge: HOME | End: 2025-05-30
Attending: FAMILY MEDICINE | Admitting: FAMILY MEDICINE
Payer: COMMERCIAL

## 2025-05-30 DIAGNOSIS — R11.0 NAUSEA: ICD-10-CM

## 2025-05-30 PROCEDURE — 76700 US EXAM ABDOM COMPLETE: CPT

## 2025-06-04 ENCOUNTER — TELEPHONE (OUTPATIENT)
Dept: GASTROENTEROLOGY | Facility: CLINIC | Age: 60
End: 2025-06-04
Payer: COMMERCIAL

## 2025-06-04 NOTE — TELEPHONE ENCOUNTER
"Endoscopy Scheduling Screen    Caller: patient    Have you had any respiratory illness or flu-like symptoms in the last 10 days?  No    What is your communication preference for Instructions and/or Bowel Prep?   MyChart    What insurance is in the chart?  Other:  [employer]    Ordering/Referring Provider: Shine   (If ordering provider performs procedure, schedule with ordering provider unless otherwise instructed. )    BMI: Estimated body mass index is 46.73 kg/m  as calculated from the following:    Height as of 5/23/25: 1.582 m (5' 2.3\").    Weight as of 5/23/25: 117 kg (258 lb).     Sedation Ordered  moderate sedation.   If patient BMI > 50 do not schedule in [facility].    If patient BMI > 45 do not schedule at [facility].    Are you taking methadone or Suboxone?  NO, No RN review required.    Have you been diagnosed and are being treated for severe PTSD or severe anxiety?  NO, No RN review required.    Are you taking any prescription medications for pain 3 or more times per week?   NO, No RN review required.    Do you have a history of malignant hyperthermia?  No    (Females) Are you currently pregnant?        Have you been diagnosed or told you have pulmonary hypertension?   No    Do you have an LVAD?  No    Have you been told you have moderate to severe sleep apnea?  No.    Have you been told you have COPD, asthma, or any other lung disease?  No    Has your doctor ordered any cardiac tests like echo, angiogram, stress test, ablation, or EKG, that you have not completed yet?  No    Do you  have a history of any heart conditions?  No     Have you ever had or are you waiting for an organ transplant?  No. Continue scheduling, no site restrictions.    Have you had a stroke or transient ischemic attack (TIA aka \"mini stroke\") in the last 2 years?   No.    Have you been diagnosed with or been told you have cirrhosis of the liver?   No.    Are you currently on dialysis?   No    Do you need assistance transferring?   No    BMI: " "Estimated body mass index is 46.73 kg/m  as calculated from the following:    Height as of 5/23/25: 1.582 m (5' 2.3\").    Weight as of 5/23/25: 117 kg (258 lb).     Is patients BMI > 40 and scheduling location [facility]?  No    Do you take an injectable or oral medication for weight loss or diabetes (excluding insulin)?  No    Do you take the medication Naltrexone?  No    Do you take blood thinners?  No       Prep   Are you currently on dialysis or do you have chronic kidney disease?  No    Do you have a diagnosis of diabetes?  No    Do you have a diagnosis of cystic fibrosis (CF)?  No    On a regular basis do you go 3 -5 days between bowel movements?  No    BMI > 40?  Yes (Extended Prep)    Preferred Pharmacy:    [facility]/pharmacy #[id number] - [address] [address]  Phone: [phone] Fax: [phone]      Final Scheduling Details     Procedure scheduled  Colonoscopy    Surgeon:  Joe     Date of procedure:  9/25/25     Pre-OP / PAC:   No - Not required for this site.    Location  [facility] - Patient preference.    Sedation   MAC/Deep Sedation location      Patient Reminders:   You will receive a call from a Nurse to review instructions and health history.  This assessment must be completed prior to your procedure.  Failure to complete the Nurse assessment may result in the procedure being cancelled.      On the day of your procedure, please designate an adult(s) who can drive you home stay with you for the next 24 hours. The medicines used in the exam will make you sleepy. You will not be able to drive.      You cannot take public transportation, ride share services, or non-medical taxi service without a responsible caregiver.  Medical transport services are allowed with the requirement that a responsible caregiver will receive you at your destination.  We require that drivers and caregivers are confirmed prior to your procedure.   "

## 2025-06-06 ENCOUNTER — TRANSFERRED RECORDS (OUTPATIENT)
Dept: HEALTH INFORMATION MANAGEMENT | Facility: CLINIC | Age: 60
End: 2025-06-06
Payer: COMMERCIAL

## 2025-06-18 ENCOUNTER — OFFICE VISIT (OUTPATIENT)
Dept: OTOLARYNGOLOGY | Facility: CLINIC | Age: 60
End: 2025-06-18
Attending: FAMILY MEDICINE
Payer: COMMERCIAL

## 2025-06-18 VITALS
BODY MASS INDEX: 47.48 KG/M2 | DIASTOLIC BLOOD PRESSURE: 85 MMHG | SYSTOLIC BLOOD PRESSURE: 125 MMHG | WEIGHT: 258 LBS | HEIGHT: 62 IN | OXYGEN SATURATION: 94 % | HEART RATE: 80 BPM

## 2025-06-18 DIAGNOSIS — J30.2 SEASONAL ALLERGIC RHINITIS, UNSPECIFIED TRIGGER: ICD-10-CM

## 2025-06-18 DIAGNOSIS — H90.3 SNHL (SENSORY-NEURAL HEARING LOSS), ASYMMETRICAL: ICD-10-CM

## 2025-06-18 DIAGNOSIS — H65.92 OME (OTITIS MEDIA WITH EFFUSION), LEFT: Primary | ICD-10-CM

## 2025-06-18 DIAGNOSIS — H73.20 MYRINGITIS: ICD-10-CM

## 2025-06-18 RX ORDER — NEOMYCIN SULFATE, POLYMYXIN B SULFATE AND HYDROCORTISONE 3.5; 10000; 1 MG/ML; [IU]/ML; MG/ML
3 SOLUTION AURICULAR (OTIC) 2 TIMES DAILY
Qty: 10 ML | Refills: 0 | Status: SHIPPED | OUTPATIENT
Start: 2025-06-18 | End: 2025-06-18

## 2025-06-18 RX ORDER — NEOMYCIN SULFATE, POLYMYXIN B SULFATE AND HYDROCORTISONE 3.5; 10000; 1 MG/ML; [IU]/ML; MG/ML
3 SOLUTION AURICULAR (OTIC) 2 TIMES DAILY
Qty: 10 ML | Refills: 0 | Status: SHIPPED | OUTPATIENT
Start: 2025-06-18

## 2025-06-18 RX ORDER — FLUTICASONE PROPIONATE 50 MCG
1 SPRAY, SUSPENSION (ML) NASAL DAILY
Qty: 18.2 ML | Refills: 1 | Status: SHIPPED | OUTPATIENT
Start: 2025-06-18

## 2025-06-18 ASSESSMENT — PAIN SCALES - GENERAL: PAINLEVEL_OUTOF10: NO PAIN (0)

## 2025-06-18 NOTE — LETTER
6/18/2025      Tracy Kern  [address] [place] [id number]      Dear Colleague,    Thank you for referring your patient, Tracy Kern, to the [facility]. Please see a copy of my visit note below.    Chief Complaint   Patient presents with     Ear Problem     History of Present Illness   Tracy Kern is a 60 year old female who presents to me today for ear evaluation. Referred by Dr.Christine Riojas for concerns of bilateral SNHL.     She tells me that she was seen in  and treated for an ear infection in May 2025. Treated with Amoxicillin, which did help. She was experiencing draining and seen again. She was placed on Prednisone, which did not work. She feels like there is something bubbling in her ear.     The patient reports an ear infection since May 10, 2025.   The patient has noticed increased difficulty hearing certain sounds and difficulty in understanding others, especially in noisy or crowded situations.  There is no history of recent head trauma, or chronic ear disease or ear surgery.  The patient reports no exposure to recreational, , and work-related noise exposure.  No family history of hearing loss at a young age. The patient denies vertigo, otorrhea, otalgia.     Feels like something is stuck in her throat.     Past Medical History  Patient Active Problem List   Diagnosis     Morbid obesity (H)     Other malaise and fatigue     Nonspecific reaction to tuberculin skin test without active tuberculosis     Right anterior shoulder pain     Rotator cuff syndrome, right     Current Medications     Current Outpatient Medications:      Acetaminophen (TYLENOL ARTHRITIS PAIN PO), , Disp: , Rfl:      IBUPROFEN PO, , Disp: , Rfl:      omeprazole (PRILOSEC) 20 MG DR capsule, Take 1 capsule (20 mg) by mouth daily., Disp: 90 capsule, Rfl: 0     predniSONE (DELTASONE) 20 MG tablet, Take 3 tabs by mouth daily for ten days then taper to 2 tabs daily for 5 days. ., Disp: 40 tablet, Rfl:  0    Allergies  Allergies   Allergen Reactions     Chloroquine Itching     Levofloxacin Itching       Social History   Social History     Socioeconomic History     Marital status:    Tobacco Use     Smoking status: Never     Passive exposure: Past     Smokeless tobacco: Never   Vaping Use     Vaping status: Never Used   Substance and Sexual Activity     Alcohol use: No     Drug use: No     Sexual activity: Yes     Partners: Male     Social Drivers of Health     Financial Resource Strain: Low Risk  (5/23/2025)    Financial Resource Strain      Within the past 12 months, have you or your family members you live with been unable to get utilities (heat, electricity) when it was really needed?: No   Food Insecurity: Low Risk  (5/23/2025)    Food Insecurity      Within the past 12 months, did you worry that your food would run out before you got money to buy more?: No      Within the past 12 months, did the food you bought just not last and you didn t have money to get more?: No   Transportation Needs: Low Risk  (5/23/2025)    Transportation Needs      Within the past 12 months, has lack of transportation kept you from medical appointments, getting your medicines, non-medical meetings or appointments, work, or from getting things that you need?: No   Physical Activity: Insufficiently Active (5/23/2025)    Exercise Vital Sign      Days of Exercise per Week: 2 days      Minutes of Exercise per Session: 30 min   Stress: Stress Concern Present (5/23/2025)    [employer] - Occupational Stress Questionnaire      Feeling of Stress : To some extent   Social Connections: Unknown (5/23/2025)    Social Connection and Isolation Panel [NHANES]      Frequency of Social Gatherings with Friends and Family: Patient declined   Interpersonal Safety: Low Risk  (5/23/2025)    Interpersonal Safety      Do you feel physically and emotionally safe where you currently live?: Yes      Within the past 12 months,  "have you been hit, slapped, kicked or otherwise physically hurt by someone?: No      Within the past 12 months, have you been humiliated or emotionally abused in other ways by your partner or ex-partner?: No   Housing Stability: Low Risk  (5/23/2025)    Housing Stability      Do you have housing? : Yes      Are you worried about losing your housing?: No       Family History  Family History   Problem Relation Age of Onset     Other - See Comments Mother         Thyroid      No Known Problems Mother      No Known Problems Father      No Known Problems Sister      No Known Problems Daughter      No Known Problems Maternal Grandmother      No Known Problems Maternal Grandfather      No Known Problems Paternal Grandmother      No Known Problems Paternal Grandfather      No Known Problems Maternal Aunt      No Known Problems Paternal Aunt      Hereditary Breast and Ovarian Cancer Syndrome No family hx of      Breast Cancer No family hx of      Cancer No family hx of      Colon Cancer No family hx of      Endometrial Cancer No family hx of      Ovarian Cancer No family hx of        Review of Systems  As per HPI and PMHx, otherwise 10+ comprehensive system review is negative.    Physical Exam  /85   Pulse 80   Ht 1.582 m (5' 2.3\")   Wt 117 kg (258 lb)   SpO2 94%   BMI 46.74 kg/m    GENERAL: Patient is a pleasant, cooperative 60 year old female in no acute distress.  HEAD: Normocephalic, atraumatic.  Hair and scalp are normal.  EYES: Pupils are equal, round, reactive to light and accommodation.  Extraocular movements are intact.  The sclera nonicteric without injection.  The extraocular structures are normal.  EARS: Normal shape and symmetry.  No tenderness when palpating the mastoid or tragal areas bilaterally.  Otoscopic exam reveals no amount of cerumen bilaterally.  The right tympanic membranes are round, intact without evidence of effusion, good landmarks.  No retraction, granulation, or drainage. LEFT - " effusion, erythema, myringitis.   NOSE: Nares are patent.  Nasal mucosa is pink.  ORAL CAVITY: Dentition is in good repair.  Mucous membranes are moist.  Tongue is mobile, protrudes to the midline.  Palate elevates symmetrically.  Tonsils are +0.  No erythema or exudate.  No oral cavity or oropharyngeal masses, lesions, ulcerations, leukoplakia.  NECK: Supple, trachea is midline.  There no palpable cervical lymphadenopathy or masses bilaterally.  Palpation of the bilateral parotid and submandibular areas reveal no masses.  No thyromegaly.    NEUROLOGIC: Cranial nerves II through XII are grossly intact.  Voice is strong.  Patient is House-Brackmann I/VI bilaterally.  CARDIOVASCULAR: Extremities are warm and well-perfused.  No significant peripheral edema.  RESPIRATORY: Patient has nonlabored breathing without cough, wheeze, stridor.  PSYCHIATRIC: Patient is alert and oriented.  Mood and affect appear normal.  SKIN: Warm and dry.  No scalp, face, or neck lesions noted.    Audiogram  The patient underwent an audiogram performed today.  My review of the audiogram shows bilateral SNHL.  Pure-tone average is 26 dB on the right and 60 dB on the left.  Speech reception threshold is 30 dB on the right and 50 dB on the left.  The patient had 96% word recognition on the right and 92% word recognition on the left.  The patient had a AD tympanogram on the right and a B tympanogram on the left.          Assessment and Plan     ICD-10-CM    1. OME (otitis media with effusion), left  H65.92 amoxicillin-clavulanate (AUGMENTIN) 875-125 MG tablet     neomycin-polymyxin-hydrocortisone (CORTISPORIN) 3.5-53687-7 otic solution     neomycin-polymyxin-hydrocortisone (CORTISPORIN) 3.5-50418-3 otic solution     DISCONTINUED: neomycin-polymyxin-hydrocortisone (CORTISPORIN) 3.5-39263-3 otic solution      2. Seasonal allergic rhinitis, unspecified trigger  J30.2 fluticasone (FLONASE) 50 MCG/ACT nasal spray      3. SNHL (sensory-neural hearing  loss), asymmetrical  H90.3       4. Myringitis  H73.20         It was my pleasure seeing Tracy Kern today in clinic.  The patient presents with a history of otitis media and eustachian tube dysfunction. She does have evidence of a left otitis media and myringitis. We discussed starting Augmentin and ear drops to reduce the inflammation on the ear drum. Cortisporin drops were prescribed due to an allergy to Levaquin.     We discussed future prevention of eustachian tube dysfunction and allergies, considering both can cause inflammation and fluid build up. We discussed starting a saline nasal rinse to help get any mucus stuck in the sinuses out, use Flonase twice daily, and Zyrtec daily. Would recommend this regimen for 3 months. If symptoms persist, then could consider a myringotomy in clinic.     We will follow up in 3-4 weeks to recheck the ear infection and discuss throat concerns with a laryngeal scope.     KASSI Mejia CNP  Otolaryngology  [place] [facility] [place]      Again, thank you for allowing me to participate in the care of your patient.        Sincerely,        KASSI Mejia CNP    Electronically signed

## 2025-06-18 NOTE — PATIENT INSTRUCTIONS
You were seen by Isai Schumacher CNP.  If you have questions or concerns regarding your appointment today, you can reach out to our call center at [phone].  The following has been recommended at your appointment today:    Start taking the Augmentin and Cortisporin drops as prescribed.   Augmentin - Take twice daily for the next 10 days.   Administer 3 drops in the left ear twice daily for the next 10 days. (Prescribed due to inflamed blood vessels on the eardrum. Medication will help reduce the inflammation.)  To prevent reoccurring ear infections we need to decrease the congestion.   Try using the following daily. (Listed below)  If symptoms do not improve your ear after 3 months    Start using the saline nasal spray rinse bottle 1 time per day. During your high peak allergy times use that 2 times per day or when you are sick with a cold or upper respiratory symptoms. You may use this in the shower.   Then use Flonase once daily 25-30 minutes after the saline nasal rinse.    Start taking the  Zyrtec daily.     Follow up, We will do a 3 week follow up to recheck your ear infection. At that time, we can do the laryngeal scope to take a look at the back of your throat.     NASAL SALINE IRRIGATION INSTRUCTIONS    You will be starting nasal saline irrigations and will need to obtain the following:      - NeilMed Sinus Rinse 8 oz Kit  - Distilled or filtered water   - Normal saline salt packets    Place filtered or distilled water into the NeilMed bottle up to the fill line (DO NOT USE TAP OR WELL WATER).  Place the pre-made salt packet in the 8 oz of saline.  Shake the bottle to suspend into solution.  Lean head forward over a sink or a basin.  Rinse each side of the nose with one-half of the bottle (each squeeze is about one-half of the bottle). Rinse the nose daily.     If you use topical nasal sprays, apply following irrigation.    Video example: https://www.youBloglovin.com/watch?v=[email or web address]

## 2025-06-18 NOTE — NURSING NOTE
"Chief Complaint   Patient presents with    Ear Problem       Vitals:    06/18/25 0917 06/18/25 0919   BP: (!) 142/91 125/85   BP Location: Left arm    Patient Position: Sitting    Cuff Size: Adult Large    Pulse: 80    SpO2: 94%    Weight: 117 kg (258 lb)    Height: 1.582 m (5' 2.3\")      Wt Readings from Last 1 Encounters:   06/18/25 117 kg (258 lb)       Patt DUMONT CMA.................6/18/2025    "

## 2025-06-20 ENCOUNTER — OFFICE VISIT (OUTPATIENT)
Dept: FAMILY MEDICINE | Facility: CLINIC | Age: 60
End: 2025-06-20
Payer: COMMERCIAL

## 2025-06-20 VITALS
WEIGHT: 254.6 LBS | SYSTOLIC BLOOD PRESSURE: 132 MMHG | TEMPERATURE: 97.5 F | HEIGHT: 62 IN | OXYGEN SATURATION: 98 % | HEART RATE: 97 BPM | BODY MASS INDEX: 46.85 KG/M2 | RESPIRATION RATE: 16 BRPM | DIASTOLIC BLOOD PRESSURE: 82 MMHG

## 2025-06-20 DIAGNOSIS — E66.01 MORBID OBESITY (H): ICD-10-CM

## 2025-06-20 DIAGNOSIS — H35.342 MACULAR HOLE OF LEFT EYE: ICD-10-CM

## 2025-06-20 DIAGNOSIS — Z01.818 PREOP GENERAL PHYSICAL EXAM: Primary | ICD-10-CM

## 2025-06-20 PROCEDURE — 99214 OFFICE O/P EST MOD 30 MIN: CPT | Performed by: FAMILY MEDICINE

## 2025-06-20 PROCEDURE — 3075F SYST BP GE 130 - 139MM HG: CPT | Performed by: FAMILY MEDICINE

## 2025-06-20 PROCEDURE — 3079F DIAST BP 80-89 MM HG: CPT | Performed by: FAMILY MEDICINE

## 2025-06-20 RX ORDER — VIT A/VIT C/VIT E/ZINC/COPPER 4296-226
CAPSULE ORAL DAILY
COMMUNITY

## 2025-06-20 RX ORDER — PREDNISOLONE ACETATE 10 MG/ML
SUSPENSION/ DROPS OPHTHALMIC
COMMUNITY
Start: 2025-06-06

## 2025-06-20 RX ORDER — TOBRAMYCIN 3 MG/ML
SOLUTION/ DROPS OPHTHALMIC
COMMUNITY
Start: 2025-06-06

## 2025-06-20 RX ORDER — CETIRIZINE HYDROCHLORIDE 10 MG/1
10 TABLET ORAL DAILY
COMMUNITY

## 2025-06-20 NOTE — PROGRESS NOTES
Preoperative Evaluation  [facility]  [address] [facility] [address]  Phone: [phone]  Primary Provider: Denae Riojas MD  Pre-op Performing Provider: Natividad Eastman DO  Jun 20, 2025 6/20/2025   Surgical Information   What procedure is being done? Left eye surgery - vitrectomy   Facility or Hospital where procedure/surgery will be performed: [facility]   Who is doing the procedure / surgery? Dr Clark   Date of surgery / procedure: 06/30/25   Time of surgery / procedure: 11 00   Where do you plan to recover after surgery? at home with family     Fax number for surgical facility: [phone]    Assessment & Plan     The proposed surgical procedure is considered LOW risk.      ICD-10-CM    1. Preop general physical exam  Z01.818       2. Macular hole of left eye  H35.342       3. Morbid obesity (H)  E66.01           Risks and Recommendations  The patient has the following additional risks and recommendations for perioperative complications:   - Morbid obesity (BMI >40)    Antiplatelet or Anticoagulation Medication Instructions   - We reviewed the medication list and the patient is not on an antiplatelet or anticoagulation medications.    Additional Medication Instructions  Take all scheduled medications on the day of surgery EXCEPT for modifications listed below:   - cetirizine and first generation antihistamines: DO NOT TAKE on day of surgery.    Recommendation  Approval given to proceed with proposed procedure, without further diagnostic evaluation.        Rosana Molina is a 60 year old, presenting for the following:  Pre-Op Exam          6/20/2025    10:57 AM   Additional Questions   Roomed by Aviva SÁNCHEZ   Accompanied by      HPI: Planned vitrectomy          6/20/2025   Pre-Op Questionnaire   Have you ever had a heart attack or stroke? No   Have you ever had surgery on your heart or blood vessels, such as a stent placement, a coronary artery  bypass, or surgery on an artery in your head, neck, heart, or legs? No   Do you have chest pain with activity? No   Do you have a history of heart failure? No   Do you currently have a cold, bronchitis or symptoms of other infection? No   Do you have a cough, shortness of breath, or wheezing? No   Do you or anyone in your family have previous history of blood clots? No   Do you or does anyone in your family have a serious bleeding problem such as prolonged bleeding following surgeries or cuts? No   Have you ever had problems with anemia or been told to take iron pills? No   Have you had any abnormal blood loss such as black, tarry or bloody stools, or abnormal vaginal bleeding? No   Have you ever had a blood transfusion? No   Are you willing to have a blood transfusion if it is medically needed before, during, or after your surgery? Yes   Have you or any of your relatives ever had problems with anesthesia? No   Do you have sleep apnea, excessive snoring or daytime drowsiness? No   Do you have any artifical heart valves or other implanted medical devices like a pacemaker, defibrillator, or continuous glucose monitor? No   Do you have artificial joints? No   Are you allergic to latex? No     Advance Care Planning    Discussed advance care planning with patient; informed AVS has link to [employer].    Preoperative Review of    reviewed - no record of controlled substances prescribed.      Status of Chronic Conditions:  See problem list for active medical problems.  Problems all longstanding and stable, except as noted/documented.  See ROS for pertinent symptoms related to these conditions.    Patient Active Problem List    Diagnosis Date Noted    Rotator cuff syndrome, right 06/26/2024     Priority: Medium    Right anterior shoulder pain 05/22/2024     Priority: Medium    Other malaise and fatigue 10/28/2020     Priority: Medium     Overview:   Created by Conversion      Nonspecific reaction to tuberculin  skin test without active tuberculosis 10/28/2020     Priority: Medium     Overview:   Created by Conversion      Morbid obesity (H) 03/19/2019     Priority: Medium      No past medical history on file.  Past Surgical History:   Procedure Laterality Date    NO HISTORY OF SURGERY       Current Outpatient Medications   Medication Sig Dispense Refill    Acetaminophen (TYLENOL ARTHRITIS PAIN PO)       amoxicillin-clavulanate (AUGMENTIN) 875-125 MG tablet Take 1 tablet by mouth 2 times daily. 20 tablet 0    Bioflavonoid Products (VITAMIN C) CHEW Take by mouth daily.      cetirizine (ZYRTEC) 10 MG tablet Take 10 mg by mouth daily.      fluticasone (FLONASE) 50 MCG/ACT nasal spray Spray 1 spray into both nostrils daily. Use in the AM after the saline nasal rinse. 18.2 mL 1    IBUPROFEN PO       Multiple Vitamins-Minerals (PRESERVISION AREDS) CAPS Take by mouth daily.      neomycin-polymyxin-hydrocortisone (CORTISPORIN) 3.5-02161-8 otic solution Place 3 drops Into the left ear 2 times daily. 10 mL 0    neomycin-polymyxin-hydrocortisone (CORTISPORIN) 3.5-69139-5 otic solution Place 3 drops in ear(s) 2 times daily. For the next 10 days. 10 mL 0    omeprazole (PRILOSEC) 20 MG DR capsule Take 1 capsule (20 mg) by mouth daily. 90 capsule 0    prednisoLONE acetate (PRED FORTE) 1 % ophthalmic suspension INSTILL 1 DROP LEFT EYE 4 TIMES A DAY START AFTER SURGERY, NOT BEFORE (Patient not taking: Reported on 6/20/2025)      tobramycin (TOBREX) 0.3 % ophthalmic solution INSTILL 1 DROP LEFT EYE 4 TIMES A DAY START AFTER SURGERY, NOT BEFORE (Patient not taking: Reported on 6/20/2025)         Allergies   Allergen Reactions    Chloroquine Itching    Levofloxacin Itching        Social History     Tobacco Use    Smoking status: Never     Passive exposure: Past    Smokeless tobacco: Never   Substance Use Topics    Alcohol use: No     Family History   Problem Relation Age of Onset    Other - See Comments Mother         Thyroid     No Known  "Problems Mother     No Known Problems Father     No Known Problems Sister     No Known Problems Daughter     No Known Problems Maternal Grandmother     No Known Problems Maternal Grandfather     No Known Problems Paternal Grandmother     No Known Problems Paternal Grandfather     No Known Problems Maternal Aunt     No Known Problems Paternal Aunt     Hereditary Breast and Ovarian Cancer Syndrome No family hx of     Breast Cancer No family hx of     Cancer No family hx of     Colon Cancer No family hx of     Endometrial Cancer No family hx of     Ovarian Cancer No family hx of      History   Drug Use No             Review of Systems  Constitutional, HEENT, cardiovascular, pulmonary, gi and gu systems are negative, except as otherwise noted.    Objective    /82   Pulse 97   Temp 97.5  F (36.4  C) (Tympanic)   Resp 16   Ht 1.575 m (5' 2\")   Wt 115.5 kg (254 lb 9.6 oz)   SpO2 98%   BMI 46.57 kg/m     Estimated body mass index is 46.57 kg/m  as calculated from the following:    Height as of this encounter: 1.575 m (5' 2\").    Weight as of this encounter: 115.5 kg (254 lb 9.6 oz).  Physical Exam  GENERAL: alert and no distress  NECK: no adenopathy, no asymmetry, masses, or scars  RESP: lungs clear to auscultation - no rales, rhonchi or wheezes  CV: regular rate and rhythm, normal S1 S2, no S3 or S4, no murmur, click or rub, no peripheral edema  MS: no gross musculoskeletal defects noted, no edema  PSYCH: mentation appears normal, affect normal/bright    Recent Labs   Lab Test 05/28/25  1258 01/08/25  1044   HGB 13.0 12.8    154     --    POTASSIUM 4.0  --    CR 0.89  --         Diagnostics  No labs were ordered during this visit.   No EKG required for low risk surgery (cataract, skin procedure, breast biopsy, etc).  No EKG required, no history of coronary heart disease, significant arrhythmia, peripheral arterial disease or other structural heart disease.    Revised Cardiac Risk Index (RCRI)  The " patient has the following serious cardiovascular risks for perioperative complications:   - No serious cardiac risks = 0 points     RCRI Interpretation: 0 points: Class I (very low risk - 0.4% complication rate)         Signed Electronically by: Natividad Eastman DO  A copy of this evaluation report is provided to the requesting physician.

## 2025-06-20 NOTE — PATIENT INSTRUCTIONS
How to Take Your Medication Before Surgery  Preoperative Medication Instructions   Antiplatelet or Anticoagulation Medication Instructions   - We reviewed the medication list and the patient is not on an antiplatelet or anticoagulation medications.    Additional Medication Instructions     Please do not take your vitamins/supplements or Zyrtec the morning of your procedure.  You can resume them the next day.           Patient Education   Preparing for Your Surgery  For Adults  Getting started  In most cases, a nurse will call to review your health history and instructions. They will give you an arrival time based on your scheduled surgery time. Please be ready to share:  Your doctor's clinic name and phone number  Your medical, surgical, and anesthesia history  A list of allergies and sensitivities  A list of medicines, including herbal treatments and over-the-counter drugs  Whether the patient has a legal guardian (ask how to send us the papers in advance)  Note: You may not receive a call if you were seen at our PAC (Preoperative Assessment Center).  Please tell us if you're pregnant--or if there's any chance you might be pregnant. Some surgeries may injure a fetus (unborn baby), so they require a pregnancy test. Surgeries that are safe for a fetus don't always need a test, and you can choose whether to have one.   Preparing for surgery  Within 10 to 30 days of surgery: Have a pre-op exam (sometimes called an H&P, or History and Physical). This can be done at a clinic or pre-operative center.  If you're having a , you may not need this exam. Talk to your care team.  At your pre-op exam, talk to your care team about all medicines you take. (This includes CBD oil and any drugs, such as THC, marijuana, and other forms of cannabis.) If you need to stop any medicine before surgery, ask when to start taking it again.  This is for your safety. Many medicines and drugs can make you bleed too much during surgery.  Some change how well surgery (anesthesia) drugs work.  Call your insurance company to let them know you're having surgery. (If you don't have insurance, call [phone].)  Call your clinic if there's any change in your health. This includes a scrape or scratch near the surgery site, or any signs of a cold (sore throat, runny nose, cough, rash, fever).  Eating and drinking guidelines  For your safety: Unless your surgeon tells you otherwise, follow the guidelines below.  Eat and drink as normal until 8 hours before you arrive for surgery. After that, no food or milk. You can spit out gum when you arrive.  Drink clear liquids until 2 hours before you arrive. These are liquids you can see through, like water, Gatorade, and Propel Water. They also include plain black coffee and tea (no cream or milk).  No alcohol for 24 hours before you arrive. The night before surgery, stop any drinks that contain THC.  If your care team tells you to take medicine on the morning of surgery, it's okay to take it with a sip of water. No other medicines or drugs are allowed (including CBD oil)--follow your care team's instructions.  If you have questions the day of surgery, call your hospital or surgery center.   Preventing infection  Shower or bathe the night before and the morning of surgery. Follow the instructions your clinic gave you. (If no instructions, use regular soap.)  Don't shave or clip hair near your surgery site. We'll remove the hair if needed.  Don't smoke or vape the morning of surgery. No chewing tobacco for 6 hours before you arrive. A nicotine patch is okay. You may spit out nicotine gum when you arrive.  For some surgeries, the surgeon will tell you to fully quit smoking and nicotine.  We will make every effort to keep you safe from infection. We will:  Clean our hands often with soap and water (or an alcohol-based hand rub).  Clean the skin at your surgery site with a special soap that kills germs.  Give you a  special gown to keep you warm. (Cold raises the risk of infection.)  Wear hair covers, masks, gowns, and gloves during surgery.  Give antibiotic medicine, if prescribed. Not all surgeries need this medicine.  What to bring on the day of surgery  Photo ID and insurance card  Copy of your health care directive, if you have one  Glasses and hearing aids (bring cases)  You can't wear contacts during surgery  Inhaler and eye drops, if you use them (tell us about these when you arrive)  CPAP machine or breathing device, if you use them  A few personal items, if spending the night  If you have . . .  A pacemaker, ICD (cardiac defibrillator), or other implant: Bring the ID card.  An implanted stimulator: Bring the remote control.  A legal guardian: Bring a copy of the certified (court-stamped) guardianship papers.  Please remove any jewelry, including body piercings. Leave jewelry and other valuables at home.  If you're going home the day of surgery  You must have a support person drive you home. They should stay with you overnight, and they may need to help with your self-care.  If you don't have a support person, please tells us as soon as possible. We can help.  After surgery  If it's hard to control your pain or you need more pain medicine, please call your surgeon's office.  Questions?   If you have any questions for your care team, list them here:   ____________________________________________________________________________________________________________________________________________________________________________________________________________________________________________________________  For informational purposes only. Not to replace the advice of your health care provider. Copyright   2003, 2019 [facility]. All rights reserved. Clinically reviewed by Luther Fernández MD. [employer] 710751 - REV 02/25.

## 2025-07-16 ENCOUNTER — OFFICE VISIT (OUTPATIENT)
Dept: OTOLARYNGOLOGY | Facility: CLINIC | Age: 60
End: 2025-07-16
Payer: COMMERCIAL

## 2025-07-16 VITALS
BODY MASS INDEX: 46.91 KG/M2 | OXYGEN SATURATION: 95 % | WEIGHT: 254.9 LBS | SYSTOLIC BLOOD PRESSURE: 140 MMHG | HEIGHT: 62 IN | DIASTOLIC BLOOD PRESSURE: 91 MMHG | HEART RATE: 67 BPM

## 2025-07-16 DIAGNOSIS — J30.89 SEASONAL ALLERGIC RHINITIS DUE TO OTHER ALLERGIC TRIGGER: ICD-10-CM

## 2025-07-16 DIAGNOSIS — K21.9 LPRD (LARYNGOPHARYNGEAL REFLUX DISEASE): Primary | ICD-10-CM

## 2025-07-16 DIAGNOSIS — R09.A2 GLOBUS SENSATION: ICD-10-CM

## 2025-07-16 DIAGNOSIS — H93.12 TINNITUS, LEFT: ICD-10-CM

## 2025-07-16 PROCEDURE — 31575 DIAGNOSTIC LARYNGOSCOPY: CPT

## 2025-07-16 PROCEDURE — 3077F SYST BP >= 140 MM HG: CPT

## 2025-07-16 PROCEDURE — 1126F AMNT PAIN NOTED NONE PRSNT: CPT

## 2025-07-16 PROCEDURE — 3079F DIAST BP 80-89 MM HG: CPT

## 2025-07-16 PROCEDURE — 99213 OFFICE O/P EST LOW 20 MIN: CPT | Mod: 25

## 2025-07-16 RX ORDER — OMEPRAZOLE 40 MG/1
40 CAPSULE, DELAYED RELEASE ORAL DAILY
Qty: 90 CAPSULE | Refills: 0 | Status: SHIPPED | OUTPATIENT
Start: 2025-07-16

## 2025-07-16 RX ORDER — CETIRIZINE HYDROCHLORIDE 10 MG/1
10 TABLET ORAL DAILY
Qty: 90 TABLET | Refills: 0 | Status: SHIPPED | OUTPATIENT
Start: 2025-07-16

## 2025-07-16 ASSESSMENT — PAIN SCALES - GENERAL: PAINLEVEL_OUTOF10: NO PAIN (0)

## 2025-07-16 NOTE — NURSING NOTE
"Chief Complaint   Patient presents with    Follow Up     Doing much better today       Vitals:    07/16/25 0921 07/16/25 0925   BP: (!) 144/87 (!) 140/91   BP Location: Right arm    Patient Position: Sitting    Cuff Size: Adult Large    Pulse: 67    SpO2: 95%    Weight: 115.6 kg (254 lb 14.4 oz)    Height: 1.575 m (5' 2\")      Wt Readings from Last 1 Encounters:   07/16/25 115.6 kg (254 lb 14.4 oz)       Patt DUMONT CMA.................7/16/2025    "

## 2025-07-16 NOTE — PROGRESS NOTES
Chief Complaint   Patient presents with    Follow Up     Doing much better today     History of Present Illness  Tracy Kern is a 60 year old female who presents to me today for ear evaluation. Referred by Dr.Christine Riojas for concerns of bilateral SNHL.     VISIT 06/18/25:   She tells me that she was seen in  and treated for an ear infection in May 2025. Treated with Amoxicillin, which did help. She was experiencing draining and seen again. She was placed on Prednisone, which did not work. She feels like there is something bubbling in her ear.      The patient reports an ear infection since May 10, 2025.   The patient has noticed increased difficulty hearing certain sounds and difficulty in understanding others, especially in noisy or crowded situations.  There is no history of recent head trauma, or chronic ear disease or ear surgery.  The patient reports no exposure to recreational, , and work-related noise exposure.  No family history of hearing loss at a young age. The patient denies vertigo, otorrhea, otalgia.      Feels like something is stuck in her throat.     RECOMMENDATION: We discussed starting Augmentin and ear drops to reduce the inflammation on the ear drum. Cortisporin drops were prescribed due to an allergy to Levaquin.     VISIT 07/16/25:     Today, she is here for a recheck of her ears and to discuss a globus sensation with a laryngeal scope.     She tells me her ears are feeling better. However, she is still feeling/hearing a noise in her left ear.      The patient reports a history of feeling like something is in the back of the throat, but has improved slightly. Symptoms have been present for since the beginning of May 2025. The patient also denies frequent throat clearing. The patient intermittent non-productive cough. Describes her voice sounding hoarse. The patient denies odynphagia, pharyngodynia, otalgia, hemoptysis, neck lumps/bumps, swelling, or  unintentional weight loss.  The patient denies any recent intubations or throat procedures.     They note  history of heartburn or gastroesophageal reflux. The patient is currently taking Omeprazole 20mg.  She takes it in the AM with food. She does not drink alcohol or caffeine. She drinks a lot of sparkling water. She eats fried, fatty, some foods with spicy food, citrus, and tomato base foods. The patient does not have history of being a smoker.     Past Medical History   Patient Active Problem List   Diagnosis    Morbid obesity (H)    Other malaise and fatigue    Nonspecific reaction to tuberculin skin test without active tuberculosis    Right anterior shoulder pain    Rotator cuff syndrome, right     Current Medications    Current Outpatient Medications:     Acetaminophen (TYLENOL ARTHRITIS PAIN PO), , Disp: , Rfl:     amoxicillin-clavulanate (AUGMENTIN) 875-125 MG tablet, Take 1 tablet by mouth 2 times daily., Disp: 20 tablet, Rfl: 0    Bioflavonoid Products (VITAMIN C) CHEW, Take by mouth daily., Disp: , Rfl:     cetirizine (ZYRTEC) 10 MG tablet, Take 10 mg by mouth daily., Disp: , Rfl:     fluticasone (FLONASE) 50 MCG/ACT nasal spray, Spray 1 spray into both nostrils daily. Use in the AM after the saline nasal rinse., Disp: 18.2 mL, Rfl: 1    IBUPROFEN PO, , Disp: , Rfl:     Multiple Vitamins-Minerals (PRESERVISION AREDS) CAPS, Take by mouth daily., Disp: , Rfl:     neomycin-polymyxin-hydrocortisone (CORTISPORIN) 3.5-48236-2 otic solution, Place 3 drops Into the left ear 2 times daily., Disp: 10 mL, Rfl: 0    neomycin-polymyxin-hydrocortisone (CORTISPORIN) 3.5-61706-5 otic solution, Place 3 drops in ear(s) 2 times daily. For the next 10 days., Disp: 10 mL, Rfl: 0    omeprazole (PRILOSEC) 20 MG DR capsule, Take 1 capsule (20 mg) by mouth daily., Disp: 90 capsule, Rfl: 0    prednisoLONE acetate (PRED FORTE) 1 % ophthalmic suspension, INSTILL 1 DROP LEFT EYE 4 TIMES A DAY START AFTER SURGERY, NOT BEFORE (Patient not taking: Reported on  6/20/2025), Disp: , Rfl:     tobramycin (TOBREX) 0.3 % ophthalmic solution, INSTILL 1 DROP LEFT EYE 4 TIMES A DAY START AFTER SURGERY, NOT BEFORE (Patient not taking: Reported on 6/20/2025), Disp: , Rfl:     Allergies   Allergies   Allergen Reactions    Chloroquine Itching    Levofloxacin Itching       Social History  Social History     Socioeconomic History    Marital status:    Tobacco Use    Smoking status: Never     Passive exposure: Past    Smokeless tobacco: Never   Vaping Use    Vaping status: Never Used   Substance and Sexual Activity    Alcohol use: No    Drug use: No    Sexual activity: Yes     Partners: Male     Social Drivers of Health     Financial Resource Strain: Low Risk  (5/23/2025)    Financial Resource Strain     Within the past 12 months, have you or your family members you live with been unable to get utilities (heat, electricity) when it was really needed?: No   Food Insecurity: Low Risk  (5/23/2025)    Food Insecurity     Within the past 12 months, did you worry that your food would run out before you got money to buy more?: No     Within the past 12 months, did the food you bought just not last and you didn t have money to get more?: No   Transportation Needs: Low Risk  (5/23/2025)    Transportation Needs     Within the past 12 months, has lack of transportation kept you from medical appointments, getting your medicines, non-medical meetings or appointments, work, or from getting things that you need?: No   Physical Activity: Insufficiently Active (5/23/2025)    Exercise Vital Sign     Days of Exercise per Week: 2 days     Minutes of Exercise per Session: 30 min   Stress: Stress Concern Present (5/23/2025)    [employer] [place] [employer] - Occupational Stress Questionnaire     Feeling of Stress : To some extent   Social Connections: Unknown (5/23/2025)    Social Connection and Isolation Panel [NHANES]     Frequency of Social Gatherings with Friends and Family: Patient  "declined   Interpersonal Safety: Low Risk  (5/23/2025)    Interpersonal Safety     Do you feel physically and emotionally safe where you currently live?: Yes     Within the past 12 months, have you been hit, slapped, kicked or otherwise physically hurt by someone?: No     Within the past 12 months, have you been humiliated or emotionally abused in other ways by your partner or ex-partner?: No   Housing Stability: Low Risk  (5/23/2025)    Housing Stability     Do you have housing? : Yes     Are you worried about losing your housing?: No       Family History  Family History   Problem Relation Age of Onset    Other - See Comments Mother         Thyroid     No Known Problems Mother     No Known Problems Father     No Known Problems Sister     No Known Problems Daughter     No Known Problems Maternal Grandmother     No Known Problems Maternal Grandfather     No Known Problems Paternal Grandmother     No Known Problems Paternal Grandfather     No Known Problems Maternal Aunt     No Known Problems Paternal Aunt     Hereditary Breast and Ovarian Cancer Syndrome No family hx of     Breast Cancer No family hx of     Cancer No family hx of     Colon Cancer No family hx of     Endometrial Cancer No family hx of     Ovarian Cancer No family hx of        Review of Systems  As per HPI and PMHx, otherwise 10+ comprehensive system review is negative.    Physical Exam  BP (!) 140/91   Pulse 67   Ht 1.575 m (5' 2\")   Wt 115.6 kg (254 lb 14.4 oz)   SpO2 95%   BMI 46.62 kg/m    GENERAL: The patient is a pleasant, cooperative 60 year old female in no acute distress.  HEAD: Normocephalic, atraumatic. Hair and scalp are normal.  EYES: Pupils are equal, round, reactive to light and accommodation. Extraocular movements are intact. The sclera nonicteric without injection. The extraocular structures are normal.  EARS: Normal shape and symmetry. No tenderness when palpating the mastoid or tragal areas bilaterally. No mastoid erythema or " fluctuance. Otoscopic exam on the right reveals no amount of cerumen. The right tympanic membrane is round, intact without evidence of effusion, good landmarks.  No retraction, granulation, or drainage. Otoscopic exam on the left reveals no amount of cerumen. The left tympanic membrane is round, intact without evidence of effusion, good landmarks.  No retraction, granulation, or drainage.  NOSE: Nares are patent.  Nasal mucosa is pink. Hypertrophy turbinate right.  ORAL CAVITY: Lips are normal. Dentition is in good repair. Mucous membranes are moist. Tongue is mobile, protrudes to the midline.  Palate elevates symmetrically. Tonsils are +0. No erythema or exudate. No oral cavity or oropharyngeal masses, lesions, ulcerations, or leukoplakia.  NECK: Supple, trachea is midline. There is no palpable cervical lymphadenopathy or masses bilaterally. Palpation of the bilateral parotid and submandibular areas reveal no masses. No thyromegaly.    NEUROLOGIC: Cranial nerves II through XII are grossly intact. Voice is strong. Patient is House-Brackmann I/VI bilaterally.  CARDIOVASCULAR: Extremities are warm and well-perfused. No significant peripheral edema.  RESPIRATORY: Patient has nonlabored breathing without cough, wheeze, stridor.  PSYCHIATRIC: Patient is alert and oriented. Mood and affect appear normal.  SKIN: Warm and dry. No scalp, face, or neck lesions noted.    Procedure: Flexible Laryngoscopy   Indication: Globus sensation    To best visualize the upper airway anatomy and due to the chief complaint and HPI, I proceeded with flexible fiberoptic laryngoscopy examination. The bilateral nasal cavities were anesthetized and decongested. The bilateral nasal cavities were examined using a flexible fiberoptic laryngoscope. There were no nasal cavity masses, polyps, or mucopurulence bilaterally. The nasal septum straight. The nasopharynx had a normal appearance with normal Eustachian tube openings and fossa of Rosenmuller  bilaterally. Normal adenoid tissue. The base of tongue, vallecula, epiglottis, aryepiglottic folds, arytenoids, and piriform sinuses were without mass or lesion. The bilateral true vocal folds were symmetrically mobile without nodules or masses. The visualized portions of the infraglottic and subglottic airway are unremarkable. The scope was removed. The patient tolerated the procedure well.                  Audiogram 05/28/25:   The patient underwent an audiogram performed today.  My review of the audiogram shows bilateral SNHL.  Pure-tone average is 26 dB on the right and 60 dB on the left.  Speech reception threshold is 30 dB on the right and 50 dB on the left.  The patient had 96% word recognition on the right and 92% word recognition on the left.  The patient had a AD tympanogram on the right and a B tympanogram on the left.          Assessment and Plan    ICD-10-CM    1. LPRD (laryngopharyngeal reflux disease)  K21.9 omeprazole (PRILOSEC) 40 MG DR capsule      2. Seasonal allergic rhinitis due to other allergic trigger  J30.89 cetirizine (ZYRTEC) 10 MG tablet      3. Globus sensation  R09.A2 Laryngoscopy, Fiber      4. Tinnitus, left  H93.12 Adult Audiology  Referral         It was my pleasure seeing Tracy Kern today in clinic for a follow up. Overall, her hearing has improved some, but she is still experiencing tinnitus in the left ear. We discussed this could be related to hearing loss. We decided to get an updated test to see if her hearing has improved and rule out a cause for the tinnitus, considering her last test that was completed she had an infection, which has cleared.     We discussed continuing the Cetrizine and Flonase. Informed her to avoid using the saline nasal rinse, due to her recent eye surgery.     The patient presents with globus sensation. There is no evidence of infection, inflammation, or neoplasm on exam to explain the symptoms. The most likely etiology is laryngeal reflux.       We increased omeprazole 20 mg to 40 mg once daily 20-30 minutes prior to morning meal and 40 mg. We provided counseling on lifestyle changes including avoidance of certain foods, sleeping on an incline, and avoiding lying down within 3-4 hours after eating. If symptoms persist, then we will discuss starting on Flonase.     I will send a Gog[employer] message in 1 month to follow up.     KASSI Mejia [facility]  Otolaryngology  [facility]

## 2025-07-16 NOTE — PATIENT INSTRUCTIONS
You were seen by Isai Schumacher CNP.  If you have questions or concerns regarding your appointment today, you can reach out to our call center at [phone].  The following has been recommended at your appointment today:    Schedule audiology test.   Starting taking the new dose of Omeprazole. This was sent to [facility] in [place].   I will send a follow through [facility] in 1 month to see how you are doing.     ALLERGIES:   Continue Flonase one time daily.    Continue with taking Zyrtec, Claritin, or Allegra.   Do not use the nasal saline rinse until your eye symptoms have improved.       Laryngeal Reflux Medication Management:     Laryngeal Reflux is when there is swelling around the arytenoids, which hold the vocal cords in place. Reflux symptoms/GERD symptoms, silent reflux (you don't feel the heart burn), and things you eat and drink can cause these symptoms.         1.Take Omeprazole 40 mg  30 min prior to breakfast.     Adult lifestyle changes to prevent LPR reviewed     Avoid eating and drinking within two to three hours prior to bedtime    Do not drink alcohol    Eat small meals and slowly    Limit problem foods:      Caffeine    Carbonated drinks     Chocolate    Peppermint    Tomato    Citrus fruits    Fatty and fried foods    Spicy     Lose weight    Quit smoking     Wear loose clothing      TINNITUS EDUCATION: (Ringing in the Ear)    Tinnitus related to hearing occurs when there is damage to the hair cells. This means the the brain generates a ringing in the ear due to the damage hair cells because they are no longer standing. This is called the threshold shift. Threshold Shift - Experiencing a threshold shift causes damage to the hair cells in your ears. When they are damaged instead of standing up they remain bend over, even if there is sound. When hair cells remain bent over it can cause a felling of fullness, ringing in the ear or possible temporary hearing loss.         Hair cells are found in the  [place]        Other Conditions that can cause Tinnitus:   Neck tension  Headaches  Clenching or Grinding teeth  Temporomandibular Joint Dysfunction (TMJ)  Stress, Depression, Anxiety  Blood Pressure  Vessels with the head/neck    This occurs due to the tension or tight muscles.     Things that can decrease the Tinnitus:   Monitor salt, caffeine, alcohol, dark chocolate intake. Decrease intake to see if symptoms improve.   Decrease use of Aspirin, Ibuprofen (IBU), and Aleve.   Continue with meditation to help maintain stress, anxiety, or depression symptoms.     Things that can help the Tinnitus if it bothers you at night/decrease the tinnitus:   May use a sound machine, fan, or sound phillip on your smart phone during sleep. (Called white noise)  Hearing Aids    Most over the counter things to help Tinnitus do not work.

## 2025-07-16 NOTE — LETTER
7/16/2025      Tracy Kern  [address] [place] [id number]      Dear Colleague,    Thank you for referring your patient, Tracy Kern, to the [facility]. Please see a copy of my visit note below.    Chief Complaint   Patient presents with     Follow Up     Doing much better today     History of Present Illness  Tracy Kern is a 60 year old female who presents to me today for ear evaluation. Referred by Dr.Christine Riojas for concerns of bilateral SNHL.     VISIT 06/18/25:   She tells me that she was seen in  and treated for an ear infection in May 2025. Treated with Amoxicillin, which did help. She was experiencing draining and seen again. She was placed on Prednisone, which did not work. She feels like there is something bubbling in her ear.      The patient reports an ear infection since May 10, 2025.   The patient has noticed increased difficulty hearing certain sounds and difficulty in understanding others, especially in noisy or crowded situations.  There is no history of recent head trauma, or chronic ear disease or ear surgery.  The patient reports no exposure to recreational, , and work-related noise exposure.  No family history of hearing loss at a young age. The patient denies vertigo, otorrhea, otalgia.      Feels like something is stuck in her throat.     RECOMMENDATION: We discussed starting Augmentin and ear drops to reduce the inflammation on the ear drum. Cortisporin drops were prescribed due to an allergy to Levaquin.     VISIT 07/16/25:     Today, she is here for a recheck of her ears and to discuss a globus sensation with a laryngeal scope.     She tells me her ears are feeling better. However, she is still feeling/hearing a noise in her left ear.      The patient reports a history of feeling like something is in the back of the throat, but has improved slightly. Symptoms have been present for since the beginning of May 2025. The patient also denies frequent throat  clearing. The patient intermittent non-productive cough. Describes her voice sounding hoarse. The patient denies odynphagia, pharyngodynia, otalgia, hemoptysis, neck lumps/bumps, swelling, or  unintentional weight loss. The patient denies any recent intubations or throat procedures.     They note  history of heartburn or gastroesophageal reflux. The patient is currently taking Omeprazole 20mg.  She takes it in the AM with food. She does not drink alcohol or caffeine. She drinks a lot of sparkling water. She eats fried, fatty, some foods with spicy food, citrus, and tomato base foods. The patient does not have history of being a smoker.     Past Medical History   Patient Active Problem List   Diagnosis     Morbid obesity (H)     Other malaise and fatigue     Nonspecific reaction to tuberculin skin test without active tuberculosis     Right anterior shoulder pain     Rotator cuff syndrome, right     Current Medications    Current Outpatient Medications:      Acetaminophen (TYLENOL ARTHRITIS PAIN PO), , Disp: , Rfl:      amoxicillin-clavulanate (AUGMENTIN) 875-125 MG tablet, Take 1 tablet by mouth 2 times daily., Disp: 20 tablet, Rfl: 0     Bioflavonoid Products (VITAMIN C) CHEW, Take by mouth daily., Disp: , Rfl:      cetirizine (ZYRTEC) 10 MG tablet, Take 10 mg by mouth daily., Disp: , Rfl:      fluticasone (FLONASE) 50 MCG/ACT nasal spray, Spray 1 spray into both nostrils daily. Use in the AM after the saline nasal rinse., Disp: 18.2 mL, Rfl: 1     IBUPROFEN PO, , Disp: , Rfl:      Multiple Vitamins-Minerals (PRESERVISION AREDS) CAPS, Take by mouth daily., Disp: , Rfl:      neomycin-polymyxin-hydrocortisone (CORTISPORIN) 3.5-68480-0 otic solution, Place 3 drops Into the left ear 2 times daily., Disp: 10 mL, Rfl: 0     neomycin-polymyxin-hydrocortisone (CORTISPORIN) 3.5-10912-4 otic solution, Place 3 drops in ear(s) 2 times daily. For the next 10 days., Disp: 10 mL, Rfl: 0     omeprazole (PRILOSEC) 20 MG DR capsule,  Take 1 capsule (20 mg) by mouth daily., Disp: 90 capsule, Rfl: 0     prednisoLONE acetate (PRED FORTE) 1 % ophthalmic suspension, INSTILL 1 DROP LEFT EYE 4 TIMES A DAY START AFTER SURGERY, NOT BEFORE (Patient not taking: Reported on 6/20/2025), Disp: , Rfl:      tobramycin (TOBREX) 0.3 % ophthalmic solution, INSTILL 1 DROP LEFT EYE 4 TIMES A DAY START AFTER SURGERY, NOT BEFORE (Patient not taking: Reported on 6/20/2025), Disp: , Rfl:     Allergies   Allergies   Allergen Reactions     Chloroquine Itching     Levofloxacin Itching       Social History  Social History     Socioeconomic History     Marital status:    Tobacco Use     Smoking status: Never     Passive exposure: Past     Smokeless tobacco: Never   Vaping Use     Vaping status: Never Used   Substance and Sexual Activity     Alcohol use: No     Drug use: No     Sexual activity: Yes     Partners: Male     Social Drivers of Health     Financial Resource Strain: Low Risk  (5/23/2025)    Financial Resource Strain      Within the past 12 months, have you or your family members you live with been unable to get utilities (heat, electricity) when it was really needed?: No   Food Insecurity: Low Risk  (5/23/2025)    Food Insecurity      Within the past 12 months, did you worry that your food would run out before you got money to buy more?: No      Within the past 12 months, did the food you bought just not last and you didn t have money to get more?: No   Transportation Needs: Low Risk  (5/23/2025)    Transportation Needs      Within the past 12 months, has lack of transportation kept you from medical appointments, getting your medicines, non-medical meetings or appointments, work, or from getting things that you need?: No   Physical Activity: Insufficiently Active (5/23/2025)    Exercise Vital Sign      Days of Exercise per Week: 2 days      Minutes of Exercise per Session: 30 min   Stress: Stress Concern Present (5/23/2025)    [employer] [place] [employer]  "Occupational Health - Occupational Stress Questionnaire      Feeling of Stress : To some extent   Social Connections: Unknown (5/23/2025)    Social Connection and Isolation Panel [NHANES]      Frequency of Social Gatherings with Friends and Family: Patient declined   Interpersonal Safety: Low Risk  (5/23/2025)    Interpersonal Safety      Do you feel physically and emotionally safe where you currently live?: Yes      Within the past 12 months, have you been hit, slapped, kicked or otherwise physically hurt by someone?: No      Within the past 12 months, have you been humiliated or emotionally abused in other ways by your partner or ex-partner?: No   Housing Stability: Low Risk  (5/23/2025)    Housing Stability      Do you have housing? : Yes      Are you worried about losing your housing?: No       Family History  Family History   Problem Relation Age of Onset     Other - See Comments Mother         Thyroid      No Known Problems Mother      No Known Problems Father      No Known Problems Sister      No Known Problems Daughter      No Known Problems Maternal Grandmother      No Known Problems Maternal Grandfather      No Known Problems Paternal Grandmother      No Known Problems Paternal Grandfather      No Known Problems Maternal Aunt      No Known Problems Paternal Aunt      Hereditary Breast and Ovarian Cancer Syndrome No family hx of      Breast Cancer No family hx of      Cancer No family hx of      Colon Cancer No family hx of      Endometrial Cancer No family hx of      Ovarian Cancer No family hx of        Review of Systems  As per HPI and PMHx, otherwise 10+ comprehensive system review is negative.    Physical Exam  BP (!) 140/91   Pulse 67   Ht 1.575 m (5' 2\")   Wt 115.6 kg (254 lb 14.4 oz)   SpO2 95%   BMI 46.62 kg/m    GENERAL: The patient is a pleasant, cooperative 60 year old female in no acute distress.  HEAD: Normocephalic, atraumatic. Hair and scalp are normal.  EYES: Pupils are equal, round, " reactive to light and accommodation. Extraocular movements are intact. The sclera nonicteric without injection. The extraocular structures are normal.  EARS: Normal shape and symmetry. No tenderness when palpating the mastoid or tragal areas bilaterally. No mastoid erythema or fluctuance. Otoscopic exam on the right reveals no amount of cerumen. The right tympanic membrane is round, intact without evidence of effusion, good landmarks.  No retraction, granulation, or drainage. Otoscopic exam on the left reveals no amount of cerumen. The left tympanic membrane is round, intact without evidence of effusion, good landmarks.  No retraction, granulation, or drainage.  NOSE: Nares are patent.  Nasal mucosa is pink. Hypertrophy turbinate right.  ORAL CAVITY: Lips are normal. Dentition is in good repair. Mucous membranes are moist. Tongue is mobile, protrudes to the midline.  Palate elevates symmetrically. Tonsils are +0. No erythema or exudate. No oral cavity or oropharyngeal masses, lesions, ulcerations, or leukoplakia.  NECK: Supple, trachea is midline. There is no palpable cervical lymphadenopathy or masses bilaterally. Palpation of the bilateral parotid and submandibular areas reveal no masses. No thyromegaly.    NEUROLOGIC: Cranial nerves II through XII are grossly intact. Voice is strong. Patient is House-Brackmann I/VI bilaterally.  CARDIOVASCULAR: Extremities are warm and well-perfused. No significant peripheral edema.  RESPIRATORY: Patient has nonlabored breathing without cough, wheeze, stridor.  PSYCHIATRIC: Patient is alert and oriented. Mood and affect appear normal.  SKIN: Warm and dry. No scalp, face, or neck lesions noted.    Procedure: Flexible Laryngoscopy   Indication: Globus sensation    To best visualize the upper airway anatomy and due to the chief complaint and HPI, I proceeded with flexible fiberoptic laryngoscopy examination. The bilateral nasal cavities were anesthetized and decongested. The  bilateral nasal cavities were examined using a flexible fiberoptic laryngoscope. There were no nasal cavity masses, polyps, or mucopurulence bilaterally. The nasal septum straight. The nasopharynx had a normal appearance with normal Eustachian tube openings and fossa of Rosenmuller bilaterally. Normal adenoid tissue. The base of tongue, vallecula, epiglottis, aryepiglottic folds, arytenoids, and piriform sinuses were without mass or lesion. The bilateral true vocal folds were symmetrically mobile without nodules or masses. The visualized portions of the infraglottic and subglottic airway are unremarkable. The scope was removed. The patient tolerated the procedure well.                  Audiogram 05/28/25:   The patient underwent an audiogram performed today.  My review of the audiogram shows bilateral SNHL.  Pure-tone average is 26 dB on the right and 60 dB on the left.  Speech reception threshold is 30 dB on the right and 50 dB on the left.  The patient had 96% word recognition on the right and 92% word recognition on the left.  The patient had a AD tympanogram on the right and a B tympanogram on the left.          Assessment and Plan    ICD-10-CM    1. LPRD (laryngopharyngeal reflux disease)  K21.9 omeprazole (PRILOSEC) 40 MG DR capsule      2. Seasonal allergic rhinitis due to other allergic trigger  J30.89 cetirizine (ZYRTEC) 10 MG tablet      3. Globus sensation  R09.A2 Laryngoscopy, Fiber      4. Tinnitus, left  H93.12 Adult Audiology  Referral         It was my pleasure seeing Tracy Kern today in clinic for a follow up. Overall, her hearing has improved some, but she is still experiencing tinnitus in the left ear. We discussed this could be related to hearing loss. We decided to get an updated test to see if her hearing has improved and rule out a cause for the tinnitus, considering her last test that was completed she had an infection, which has cleared.     We discussed continuing the Cetrizine  and Flonase. Informed her to avoid using the saline nasal rinse, due to her recent eye surgery.     The patient presents with globus sensation. There is no evidence of infection, inflammation, or neoplasm on exam to explain the symptoms. The most likely etiology is laryngeal reflux.      We increased omeprazole 20 mg to 40 mg once daily 20-30 minutes prior to morning meal and 40 mg. We provided counseling on lifestyle changes including avoidance of certain foods, sleeping on an incline, and avoiding lying down within 3-4 hours after eating. If symptoms persist, then we will discuss starting on Flonase.     I will send a [employer] message in 1 month to follow up.     KASSI Mejia CNP  Otolaryngology  [place] [facility] [place]      Again, thank you for allowing me to participate in the care of your patient.        Sincerely,        KASSI Mejia CNP    Electronically signed

## 2025-07-17 ENCOUNTER — PATIENT OUTREACH (OUTPATIENT)
Dept: CARE COORDINATION | Facility: CLINIC | Age: 60
End: 2025-07-17
Payer: COMMERCIAL

## 2025-08-11 ENCOUNTER — TELEPHONE (OUTPATIENT)
Dept: OTOLARYNGOLOGY | Facility: CLINIC | Age: 60
End: 2025-08-11
Payer: COMMERCIAL

## 2025-08-13 ENCOUNTER — TELEPHONE (OUTPATIENT)
Dept: OTOLARYNGOLOGY | Facility: CLINIC | Age: 60
End: 2025-08-13

## 2025-08-13 ENCOUNTER — OFFICE VISIT (OUTPATIENT)
Dept: SLEEP MEDICINE | Facility: CLINIC | Age: 60
End: 2025-08-13
Attending: FAMILY MEDICINE
Payer: COMMERCIAL

## 2025-08-13 VITALS
BODY MASS INDEX: 47.55 KG/M2 | OXYGEN SATURATION: 99 % | RESPIRATION RATE: 16 BRPM | HEIGHT: 62 IN | SYSTOLIC BLOOD PRESSURE: 156 MMHG | DIASTOLIC BLOOD PRESSURE: 86 MMHG | HEART RATE: 61 BPM | WEIGHT: 258.4 LBS

## 2025-08-13 DIAGNOSIS — R06.83 SNORING: ICD-10-CM

## 2025-08-13 DIAGNOSIS — R06.89 HYPOVENTILATION: ICD-10-CM

## 2025-08-13 DIAGNOSIS — G47.33 OSA (OBSTRUCTIVE SLEEP APNEA): Primary | ICD-10-CM

## 2025-08-13 PROCEDURE — 3077F SYST BP >= 140 MM HG: CPT | Performed by: INTERNAL MEDICINE

## 2025-08-13 PROCEDURE — 3079F DIAST BP 80-89 MM HG: CPT | Performed by: INTERNAL MEDICINE

## 2025-08-13 PROCEDURE — 1126F AMNT PAIN NOTED NONE PRSNT: CPT | Performed by: INTERNAL MEDICINE

## 2025-08-13 PROCEDURE — 99205 OFFICE O/P NEW HI 60 MIN: CPT | Performed by: INTERNAL MEDICINE

## 2025-08-13 ASSESSMENT — SLEEP AND FATIGUE QUESTIONNAIRES
HOW LIKELY ARE YOU TO NOD OFF OR FALL ASLEEP WHILE SITTING AND READING: MODERATE CHANCE OF DOZING
HOW LIKELY ARE YOU TO NOD OFF OR FALL ASLEEP WHILE SITTING QUIETLY AFTER LUNCH WITHOUT ALCOHOL: MODERATE CHANCE OF DOZING
HOW LIKELY ARE YOU TO NOD OFF OR FALL ASLEEP IN A CAR, WHILE STOPPED FOR A FEW MINUTES IN TRAFFIC: WOULD NEVER DOZE
HOW LIKELY ARE YOU TO NOD OFF OR FALL ASLEEP WHILE SITTING AND TALKING TO SOMEONE: SLIGHT CHANCE OF DOZING
HOW LIKELY ARE YOU TO NOD OFF OR FALL ASLEEP WHILE SITTING INACTIVE IN A PUBLIC PLACE: MODERATE CHANCE OF DOZING
HOW LIKELY ARE YOU TO NOD OFF OR FALL ASLEEP WHEN YOU ARE A PASSENGER IN A CAR FOR AN HOUR WITHOUT A BREAK: MODERATE CHANCE OF DOZING
HOW LIKELY ARE YOU TO NOD OFF OR FALL ASLEEP WHILE LYING DOWN TO REST IN THE AFTERNOON WHEN CIRCUMSTANCES PERMIT: MODERATE CHANCE OF DOZING
HOW LIKELY ARE YOU TO NOD OFF OR FALL ASLEEP WHILE WATCHING TV: MODERATE CHANCE OF DOZING

## 2025-08-18 DIAGNOSIS — R11.0 NAUSEA: ICD-10-CM

## 2025-08-18 RX ORDER — OMEPRAZOLE 20 MG/1
20 CAPSULE, DELAYED RELEASE ORAL DAILY
Qty: 90 CAPSULE | Refills: 0 | Status: SHIPPED | OUTPATIENT
Start: 2025-08-18

## 2025-08-21 ENCOUNTER — OFFICE VISIT (OUTPATIENT)
Dept: OTOLARYNGOLOGY | Facility: CLINIC | Age: 60
End: 2025-08-21
Payer: COMMERCIAL

## 2025-08-21 VITALS
HEART RATE: 55 BPM | WEIGHT: 258 LBS | SYSTOLIC BLOOD PRESSURE: 157 MMHG | DIASTOLIC BLOOD PRESSURE: 99 MMHG | OXYGEN SATURATION: 100 % | BODY MASS INDEX: 47.48 KG/M2 | HEIGHT: 62 IN

## 2025-08-21 DIAGNOSIS — J30.2 SEASONAL ALLERGIC RHINITIS, UNSPECIFIED TRIGGER: ICD-10-CM

## 2025-08-21 DIAGNOSIS — H65.92 MIDDLE EAR EFFUSION, LEFT: Primary | ICD-10-CM

## 2025-08-21 ASSESSMENT — PAIN SCALES - GENERAL: PAINLEVEL_OUTOF10: NO PAIN (0)

## 2025-09-04 ENCOUNTER — TELEPHONE (OUTPATIENT)
Dept: GASTROENTEROLOGY | Facility: CLINIC | Age: 60
End: 2025-09-04
Payer: COMMERCIAL

## 2025-09-04 DIAGNOSIS — Z12.11 SPECIAL SCREENING FOR MALIGNANT NEOPLASMS, COLON: Primary | ICD-10-CM

## 2025-09-04 RX ORDER — BISACODYL 5 MG/1
TABLET, DELAYED RELEASE ORAL
Qty: 4 TABLET | Refills: 0 | Status: SHIPPED | OUTPATIENT
Start: 2025-09-04